# Patient Record
Sex: FEMALE | Race: WHITE | NOT HISPANIC OR LATINO | Employment: FULL TIME | URBAN - METROPOLITAN AREA
[De-identification: names, ages, dates, MRNs, and addresses within clinical notes are randomized per-mention and may not be internally consistent; named-entity substitution may affect disease eponyms.]

---

## 2017-01-25 ENCOUNTER — APPOINTMENT (OUTPATIENT)
Dept: RADIOLOGY | Facility: MEDICAL CENTER | Age: 52
End: 2017-01-25
Attending: ORTHOPAEDIC SURGERY
Payer: COMMERCIAL

## 2017-01-25 DIAGNOSIS — S83.232D COMPLEX TEAR OF MEDIAL MENISCUS, CURRENT INJURY, LEFT KNEE, SUBSEQUENT ENCOUNTER: ICD-10-CM

## 2017-02-23 DIAGNOSIS — Z01.810 PRE-OPERATIVE CARDIOVASCULAR EXAMINATION: ICD-10-CM

## 2017-02-23 DIAGNOSIS — Z01.812 PRE-OPERATIVE LABORATORY EXAMINATION: ICD-10-CM

## 2017-02-23 LAB
ANION GAP SERPL CALC-SCNC: 9 MMOL/L (ref 0–11.9)
BUN SERPL-MCNC: 16 MG/DL (ref 8–22)
CALCIUM SERPL-MCNC: 9.8 MG/DL (ref 8.4–10.2)
CHLORIDE SERPL-SCNC: 102 MMOL/L (ref 96–112)
CO2 SERPL-SCNC: 27 MMOL/L (ref 20–33)
CREAT SERPL-MCNC: 0.8 MG/DL (ref 0.5–1.4)
ERYTHROCYTE [DISTWIDTH] IN BLOOD BY AUTOMATED COUNT: 43.9 FL (ref 35.9–50)
GFR SERPL CREATININE-BSD FRML MDRD: >60 ML/MIN/1.73 M 2
GLUCOSE SERPL-MCNC: 115 MG/DL (ref 65–99)
HCT VFR BLD AUTO: 44.6 % (ref 37–47)
HGB BLD-MCNC: 15.4 G/DL (ref 12–16)
MCH RBC QN AUTO: 30 PG (ref 27–33)
MCHC RBC AUTO-ENTMCNC: 34.5 G/DL (ref 33.6–35)
MCV RBC AUTO: 86.9 FL (ref 81.4–97.8)
PLATELET # BLD AUTO: 269 K/UL (ref 164–446)
PMV BLD AUTO: 10.5 FL (ref 9–12.9)
POTASSIUM SERPL-SCNC: 3.9 MMOL/L (ref 3.6–5.5)
RBC # BLD AUTO: 5.13 M/UL (ref 4.2–5.4)
SODIUM SERPL-SCNC: 138 MMOL/L (ref 135–145)
WBC # BLD AUTO: 9.3 K/UL (ref 4.8–10.8)

## 2017-02-23 PROCEDURE — 85027 COMPLETE CBC AUTOMATED: CPT

## 2017-02-23 PROCEDURE — 36415 COLL VENOUS BLD VENIPUNCTURE: CPT

## 2017-02-23 PROCEDURE — 80048 BASIC METABOLIC PNL TOTAL CA: CPT

## 2017-02-23 RX ORDER — LISINOPRIL 5 MG/1
5 TABLET ORAL DAILY
COMMUNITY
End: 2018-10-22 | Stop reason: SDUPTHER

## 2017-02-23 RX ORDER — MELOXICAM 15 MG/1
15 TABLET ORAL DAILY
COMMUNITY
End: 2018-06-20

## 2017-02-23 RX ORDER — ESTRADIOL 1 MG/1
1 TABLET ORAL DAILY
COMMUNITY
End: 2018-06-20

## 2017-02-23 RX ORDER — CITALOPRAM 20 MG/1
20 TABLET ORAL DAILY
COMMUNITY
End: 2018-10-22 | Stop reason: SDUPTHER

## 2017-02-23 RX ORDER — LANSOPRAZOLE 30 MG/1
30 CAPSULE, DELAYED RELEASE ORAL DAILY
COMMUNITY
End: 2020-07-09 | Stop reason: SDUPTHER

## 2017-02-23 RX ORDER — HYDROCHLOROTHIAZIDE 25 MG/1
25 TABLET ORAL DAILY
COMMUNITY
End: 2018-10-22 | Stop reason: SDUPTHER

## 2017-02-23 NOTE — OR NURSING
Pre-admit appointment completed. Pt instructed to continue regularly prescribed medications through the day before surgery. Pt instructed to take the following medications the day of surgery with a sip of water, per anesthesia protocol; prevacid and xanax if needed.     Pt given HODA education.

## 2017-02-24 LAB — EKG IMPRESSION: NORMAL

## 2017-03-06 ENCOUNTER — HOSPITAL ENCOUNTER (OUTPATIENT)
Facility: MEDICAL CENTER | Age: 52
End: 2017-03-06
Attending: ORTHOPAEDIC SURGERY | Admitting: ORTHOPAEDIC SURGERY
Payer: COMMERCIAL

## 2017-03-06 VITALS
HEART RATE: 92 BPM | DIASTOLIC BLOOD PRESSURE: 96 MMHG | OXYGEN SATURATION: 95 % | TEMPERATURE: 97.3 F | BODY MASS INDEX: 36.19 KG/M2 | WEIGHT: 238.76 LBS | RESPIRATION RATE: 16 BRPM | SYSTOLIC BLOOD PRESSURE: 143 MMHG | HEIGHT: 68 IN

## 2017-03-06 PROBLEM — S83.232A COMPLEX TEAR OF MEDIAL MENISCUS OF LEFT KNEE AS CURRENT INJURY: Status: ACTIVE | Noted: 2017-03-06

## 2017-03-06 PROCEDURE — 160002 HCHG RECOVERY MINUTES (STAT): Performed by: ORTHOPAEDIC SURGERY

## 2017-03-06 PROCEDURE — 160025 RECOVERY II MINUTES (STATS): Performed by: ORTHOPAEDIC SURGERY

## 2017-03-06 PROCEDURE — 160036 HCHG PACU - EA ADDL 30 MINS PHASE I: Performed by: ORTHOPAEDIC SURGERY

## 2017-03-06 PROCEDURE — 502580 HCHG PACK, KNEE ARTHROSCOPY: Performed by: ORTHOPAEDIC SURGERY

## 2017-03-06 PROCEDURE — 160009 HCHG ANES TIME/MIN: Performed by: ORTHOPAEDIC SURGERY

## 2017-03-06 PROCEDURE — 110382 HCHG SHELL REV 271: Performed by: ORTHOPAEDIC SURGERY

## 2017-03-06 PROCEDURE — 160029 HCHG SURGERY MINUTES - 1ST 30 MINS LEVEL 4: Performed by: ORTHOPAEDIC SURGERY

## 2017-03-06 PROCEDURE — 700101 HCHG RX REV CODE 250

## 2017-03-06 PROCEDURE — 160041 HCHG SURGERY MINUTES - EA ADDL 1 MIN LEVEL 4: Performed by: ORTHOPAEDIC SURGERY

## 2017-03-06 PROCEDURE — A6222 GAUZE <=16 IN NO W/SAL W/O B: HCPCS | Performed by: ORTHOPAEDIC SURGERY

## 2017-03-06 PROCEDURE — 700111 HCHG RX REV CODE 636 W/ 250 OVERRIDE (IP)

## 2017-03-06 PROCEDURE — 110371 HCHG SHELL REV 272: Performed by: ORTHOPAEDIC SURGERY

## 2017-03-06 PROCEDURE — 160035 HCHG PACU - 1ST 60 MINS PHASE I: Performed by: ORTHOPAEDIC SURGERY

## 2017-03-06 PROCEDURE — 160048 HCHG OR STATISTICAL LEVEL 1-5: Performed by: ORTHOPAEDIC SURGERY

## 2017-03-06 PROCEDURE — 501654 HCHG TUBING, ARTHREX PATIENT REDEUCE: Performed by: ORTHOPAEDIC SURGERY

## 2017-03-06 PROCEDURE — 501336 HCHG SHAVER: Performed by: ORTHOPAEDIC SURGERY

## 2017-03-06 PROCEDURE — A4606 OXYGEN PROBE USED W OXIMETER: HCPCS | Performed by: ORTHOPAEDIC SURGERY

## 2017-03-06 PROCEDURE — 160046 HCHG PACU - 1ST 60 MINS PHASE II: Performed by: ORTHOPAEDIC SURGERY

## 2017-03-06 RX ORDER — SODIUM CHLORIDE, SODIUM LACTATE, POTASSIUM CHLORIDE, CALCIUM CHLORIDE 600; 310; 30; 20 MG/100ML; MG/100ML; MG/100ML; MG/100ML
1000 INJECTION, SOLUTION INTRAVENOUS
Status: COMPLETED | OUTPATIENT
Start: 2017-03-06 | End: 2017-03-06

## 2017-03-06 RX ORDER — OXYCODONE HYDROCHLORIDE 5 MG/1
5 TABLET ORAL
Status: DISCONTINUED | OUTPATIENT
Start: 2017-03-06 | End: 2017-03-06 | Stop reason: HOSPADM

## 2017-03-06 RX ORDER — BUPIVACAINE HYDROCHLORIDE 2.5 MG/ML
INJECTION, SOLUTION EPIDURAL; INFILTRATION; INTRACAUDAL
Status: DISCONTINUED | OUTPATIENT
Start: 2017-03-06 | End: 2017-03-06 | Stop reason: HOSPADM

## 2017-03-06 RX ORDER — DEXTROSE MONOHYDRATE, SODIUM CHLORIDE, AND POTASSIUM CHLORIDE 50; 1.49; 4.5 G/1000ML; G/1000ML; G/1000ML
INJECTION, SOLUTION INTRAVENOUS CONTINUOUS
Status: DISCONTINUED | OUTPATIENT
Start: 2017-03-06 | End: 2017-03-06 | Stop reason: HOSPADM

## 2017-03-06 RX ORDER — DEXAMETHASONE SODIUM PHOSPHATE 4 MG/ML
4 INJECTION, SOLUTION INTRA-ARTICULAR; INTRALESIONAL; INTRAMUSCULAR; INTRAVENOUS; SOFT TISSUE
Status: DISCONTINUED | OUTPATIENT
Start: 2017-03-06 | End: 2017-03-06 | Stop reason: HOSPADM

## 2017-03-06 RX ORDER — OXYCODONE HYDROCHLORIDE 5 MG/1
2.5 TABLET ORAL
Status: DISCONTINUED | OUTPATIENT
Start: 2017-03-06 | End: 2017-03-06 | Stop reason: HOSPADM

## 2017-03-06 RX ORDER — HALOPERIDOL 5 MG/ML
1 INJECTION INTRAMUSCULAR EVERY 6 HOURS PRN
Status: DISCONTINUED | OUTPATIENT
Start: 2017-03-06 | End: 2017-03-06 | Stop reason: HOSPADM

## 2017-03-06 RX ORDER — DIPHENHYDRAMINE HYDROCHLORIDE 50 MG/ML
25 INJECTION INTRAMUSCULAR; INTRAVENOUS EVERY 6 HOURS PRN
Status: DISCONTINUED | OUTPATIENT
Start: 2017-03-06 | End: 2017-03-06 | Stop reason: HOSPADM

## 2017-03-06 RX ORDER — ONDANSETRON 2 MG/ML
4 INJECTION INTRAMUSCULAR; INTRAVENOUS EVERY 4 HOURS PRN
Status: DISCONTINUED | OUTPATIENT
Start: 2017-03-06 | End: 2017-03-06 | Stop reason: HOSPADM

## 2017-03-06 RX ADMIN — SODIUM CHLORIDE, SODIUM LACTATE, POTASSIUM CHLORIDE, CALCIUM CHLORIDE 1000 ML: 600; 310; 30; 20 INJECTION, SOLUTION INTRAVENOUS at 11:00

## 2017-03-06 ASSESSMENT — PAIN SCALES - GENERAL
PAINLEVEL_OUTOF10: 0

## 2017-03-06 NOTE — IP AVS SNAPSHOT
" Home Care Instructions                                                                                                                Name:Shana Dunn  Medical Record Number:[id number]  CSN: [id number]    YOB: 1965   Age: 51 y.o.  Sex: female  HT:1.727 m (5' 7.99\") WT: 108.3 kg (238 lb 12.1 oz)          Admit Date: 3/6/2017     Discharge Date:   Today's Date: 3/6/2017  Attending Doctor:  Mati Wagoner M.D.                  Allergies:  Penicillins; Zithromax; Crestor; and Wheat bran                Discharge Instructions         ACTIVITY: Rest and take it easy for the first 24 hours.  A responsible adult is recommended to remain with you during that time.  It is normal to feel sleepy.  We encourage you to not do anything that requires balance, judgment or coordination.    MILD FLU-LIKE SYMPTOMS ARE NORMAL. YOU MAY EXPERIENCE GENERALIZED MUSCLE ACHES, THROAT IRRITATION, HEADACHE AND/OR SOME NAUSEA.    FOR 24 HOURS DO NOT:  Drive, operate machinery or run household appliances.  Drink beer or alcoholic beverages.   Make important decisions or sign legal documents.    SPECIAL INSTRUCTIONS: Weight bear as tolerated    DIET: To avoid nausea, slowly advance diet as tolerated, avoiding spicy or greasy foods for the first day.  Add more substantial food to your diet according to your physician's instructions. INCREASE FLUIDS AND FIBER TO AVOID CONSTIPATION.    SURGICAL DRESSING/BATHING: Keep dressing clean, dry and intact until instructed otherwise by surgeon    FOLLOW-UP APPOINTMENT:  A follow-up appointment should be arranged with your doctor; call to schedule.    You should CALL YOUR PHYSICIAN if you develop:  Fever greater than 101 degrees F.  Pain not relieved by medication, or persistent nausea or vomiting.  Excessive bleeding (blood soaking through dressing) or unexpected drainage from the wound.  Extreme redness or swelling around the incision site, drainage of pus or foul smelling drainage.  Inability " to urinate or empty your bladder within 8 hours.  Problems with breathing or chest pain.    You should call 911 if you develop problems with breathing or chest pain.  If you are unable to contact your doctor or surgical center, you should go to the nearest emergency room or urgent care center.  Physician's telephone #: [phone]    If any questions arise, call your doctor.  If your doctor is not available, please feel free to call the Surgical Center at [phone].  The Center is open Monday through Friday from 7AM to 7PM.  You can also call the HEALTH HOTLINE open 24 hours/day, 7 days/week and speak to a nurse at [phone], or toll free at [phone].    A registered nurse may call you a few days after your surgery to see how you are doing after your procedure.    MEDICATIONS: Resume taking daily medication.  Take prescribed pain medication with food.  If no medication is prescribed, you may take non-aspirin pain medication if needed.  PAIN MEDICATION CAN BE VERY CONSTIPATING.  Take a stool softener or laxative such as senokot, pericolace, or milk of magnesia if needed.    Prescription given for home.  Last pain medication given at ______________.    If your physician has prescribed pain medication that includes Acetaminophen (Tylenol), do not take additional Acetaminophen (Tylenol) while taking the prescribed medication.    Depression / Suicide Risk    As you are discharged from this [facility] facility, it is important to learn how to keep safe from harming yourself.    Recognize the warning signs:  · Abrupt changes in personality, positive or negative- including increase in energy   · Giving away possessions  · Change in eating patterns- significant weight changes-  positive or negative  · Change in sleeping patterns- unable to sleep or sleeping all the time   · Unwillingness or inability to communicate  · Depression  · Unusual sadness, discouragement and loneliness  · Talk of wanting to  die  · Neglect of personal appearance   · Rebelliousness- reckless behavior  · Withdrawal from people/activities they love  · Confusion- inability to concentrate     If you or a loved one observes any of these behaviors or has concerns about self-harm, here's what you can do:  · Talk about it- your feelings and reasons for harming yourself  · Remove any means that you might use to hurt yourself (examples: pills, rope, extension cords, firearm)  · Get professional help from the community (Mental Health, Substance Abuse, psychological counseling)  · Do not be alone:Call your Safe Contact- someone whom you trust who will be there for you.  · Call your local CRISIS HOTLINE 668-9195 or 943-437-9707  · Call your local Children's Mobile Crisis Response Team [place] (153) 288-5167 or www.[employer]  · Call the toll free National Suicide Prevention Hotlines   · National Suicide Prevention Lifeline 682-369-FUQP (3662)  National [employer] Line Network 800-SUICIDE (165-2025)    Discharge Education for patients on HODA (Obstructive Sleep Apnea) Protocol    Prior to receiving sedation or anesthesia, we screen all patients for Obstructive Sleep Apnea.  During your screening, you were identified as having suspected, but not confirmed Obstructive Sleep Apnea(HODA).    What is Obstructive Sleep Apnea?  Sleep apnea (AP-ne-ah) is a common disorder which involves breathing pauses that occur during sleep.  These can last from 10 seconds to a minute or longer.  Normal breathing resumes often with a loud snort or choking sound.    Sleep apnea occurs in all age groups and both genders but is more common in men and people over 40 years of age.  It has been estimated that as many as 18 million Americans have sleep apnea.  Most people who have sleep apnea don’t know they have it because it only occurs during sleep.  A family member and/or bed partner may first notice the signs of sleep apnea.  Sleep apnea is a chronic (ongoing) condition  that disrupts the quality and quantity of your sleep repeatedly throughout the night.  This often results in excessive daytime sleepiness or fatigue during the day.  It may also contribute to high blood pressure, heart problems, and complications following medications used for surgery and procedures.    To establish a definitive diagnosis, further testing from a specialist would be needed.  We recommend that you follow up with your primary care physician.    We recommend that you should be with an adult observer for at least 24 hours after your sedation/anesthesia.  If you have a CPAP machine, you should wear it during any sleep period (day or night) for the week following your procedure.  We encourage you to sleep on your side or in a sitting position, even with napping.  Lying flat on your back increases the risk of apnea and airway obstruction during your post procedure recovery period.    It is important to prevent over-sedation that could increase your risk for apnea.  Please take all pain medication as directed by your physician.  If you are not getting pain relief, please contact your physician to discuss possible approaches to relieving pain while minimizing medications that can affect your breathing and oxygen levels.      ·        Medication List      CONTINUE taking these medications        Instructions    BIOTIN PO    Take 1,000 Units by mouth every day.   Dose:  1000 Units       citalopram 20 MG Tabs   Commonly known as:  CELEXA    Take 20 mg by mouth every day.   Dose:  20 mg       CONTRAVE PO    Take 40 mg by mouth every day.   Dose:  40 mg       estradiol 1 MG Tabs   Commonly known as:  ESTRACE    Take 1 mg by mouth every day.   Dose:  1 mg       hydrochlorothiazide 25 MG Tabs   Commonly known as:  HYDRODIURIL    Take 25 mg by mouth every day.   Dose:  25 mg       lansoprazole 30 MG Cpdr   Commonly known as:  PREVACID    Take 30 mg by mouth every day.   Dose:  30 mg       lisinopril 5 MG Tabs    Commonly known as:  PRINIVIL    Take 5 mg by mouth every day.   Dose:  5 mg       meloxicam 15 MG tablet   Commonly known as:  MOBIC    Take 15 mg by mouth every day.   Dose:  15 mg       vitamin D3 5000 UNITS Caps    Take 1 Cap by mouth every day.   Dose:  1 Cap       XANAX PO    Take  by mouth as needed.               Medication Information     Above and/or attached are the medications your physician expects you to take upon discharge. Review all of your home medications and newly ordered medications with your doctor and/or pharmacist. Follow medication instructions as directed by your doctor and/or pharmacist. Please keep your medication list with you and share with your physician. Update the information when medications are discontinued, doses are changed, or new medications (including over-the-counter products) are added; and carry medication information at all times in the event of emergency situations.        Resources     Quit Smoking / Tobacco Use:    I understand the use of any tobacco products increases my chance of suffering from future heart disease or stroke and could cause other illnesses which may shorten my life. Quitting the use of tobacco products is the single most important thing I can do to improve my health. For further information on smoking / tobacco cessation call a Toll Free Quit Line at 1-195.978.4631 (*[facility] [place]) or 1-560.724.9823 (American Lung Association) or you can access the web based program at www.lungusa.org.    [place] Tobacco Users Help Line:  (137) 803-2777       Toll Free: 1-182.763.4061  Quit Tobacco Program [facility] (763)194-4852    Crisis Hotline:    [place] Crisis Hotline:  5-356-GAKESAQ or 1-466.201.9074    [place] Crisis Hotline:    1-445.138.4442 or 716-780-8008    Discharge Survey:   Thank you for choosing [facility]. We hope we did everything we could to make your hospital stay a pleasant one. You may be receiving a survey  and we would appreciate your time and participation in answering the questions. Your input is very valuable to us in our efforts to improve our service to our patients and their families.            Signatures     My signature on this form indicates that:    1. I acknowledge receipt and understanding of these Home Care Instruction.  2. My questions regarding this information have been answered to my satisfaction.  3. I have formulated a plan with my discharge nurse to obtain my prescribed medications for home.    __________________________________      __________________________________                   Patient Signature                                 Guardian/Responsible Adult Signature      __________________________________                 __________       ________                       Nurse Signature                                               Date                 Time

## 2017-03-06 NOTE — OR NURSING
1315-Patient arrived in pacu stage 2, awake, alert and oriented. Patient dressed and able to transfer from [facility] to [facility] without difficulty, gait was steady. Vital signs are stable. Patient denies any pain or nausea at this time. No sign or symptoms of acute distress seen. Dressing right knee clean, dry and intact. Immobilizer to right leg in place, cap refill <3 sec to operative leg. CMS intact.   1325-Patient awake, A&O, vital signs remain stable, no sign and symptoms of acute distress seen, and denies any pain or nausea. Patient meets discharge criteria. Plan of care and discharge instructions reviewed and discussed with patient and family. All questions were answered and all parties verbalized understanding.

## 2017-03-06 NOTE — OR NURSING
1204: To PACU from OR via [facility], very drowsy, rouses briefly for assessment and to deny pain/nausea, respirations spontaneous and non-labored. Icepack applied over c/d/i R knee surgical dressings. Plan to keep pt in PACU for full hour per [facility] protocol.  1215: No change, O2 weaning commenced.  1230: O2 d/jo, pt continues to deny pain.   1245: Tolerating po water.  1300: No change in surgical site assessment.  1304: Meets criteria to transfer to Stage 2

## 2017-03-06 NOTE — OR SURGEON
Immediate Post-Operative Note      PreOp Diagnosis: R MMT    PostOp Diagnosis: R MMT/LMT    Procedure(s):  KNEE ARTHROSCOPY - Wound Class: Clean  MEDIAL AND LATERAL MENISCECTOMY - PARTIAL, CHONDROPLASTY - Wound Class: Clean    Surgeon(s):  Mati Wagoner M.D.    Anesthesiologist/Type of Anesthesia:  Anesthesiologist: Cesar Mueller M.D./General    Surgical Staff:  Circulator: Jana Guajardo R.N.  Scrub Person: Natalie Rubio    Specimen: none    Estimated Blood Loss: min    Findings: above    Complications: none        3/6/2017 12:01 PM Mati Wagoner

## 2017-03-06 NOTE — IP AVS SNAPSHOT
3/6/2017          Shana Dunn  [address] [place] [address]    Dear Shana:    [facility] wants to ensure your discharge home is safe and you or your loved ones have had all your questions answered regarding your care after you leave the hospital.    You may receive a telephone call within two days of your discharge.  This call is to make certain you understand your discharge instructions as well as ensure we provided you with the best care possible during your stay with us.     The call will only last approximately 3-5 minutes and will be done by a nurse.    Once again, we want to ensure your discharge home is safe and that you have a clear understanding of any next steps in your care.  If you have any questions or concerns, please do not hesitate to contact us, we are here for you.  Thank you for choosing [facility] for your healthcare needs.    Sincerely,    Williams Gautam    [facility]

## 2017-03-06 NOTE — DISCHARGE INSTRUCTIONS
ACTIVITY: Rest and take it easy for the first 24 hours.  A responsible adult is recommended to remain with you during that time.  It is normal to feel sleepy.  We encourage you to not do anything that requires balance, judgment or coordination.    MILD FLU-LIKE SYMPTOMS ARE NORMAL. YOU MAY EXPERIENCE GENERALIZED MUSCLE ACHES, THROAT IRRITATION, HEADACHE AND/OR SOME NAUSEA.    FOR 24 HOURS DO NOT:  Drive, operate machinery or run household appliances.  Drink beer or alcoholic beverages.   Make important decisions or sign legal documents.    SPECIAL INSTRUCTIONS: Weight bear as tolerated    DIET: To avoid nausea, slowly advance diet as tolerated, avoiding spicy or greasy foods for the first day.  Add more substantial food to your diet according to your physician's instructions. INCREASE FLUIDS AND FIBER TO AVOID CONSTIPATION.    SURGICAL DRESSING/BATHING: Keep dressing clean, dry and intact until instructed otherwise by surgeon    FOLLOW-UP APPOINTMENT:  A follow-up appointment should be arranged with your doctor; call to schedule.    You should CALL YOUR PHYSICIAN if you develop:  Fever greater than 101 degrees F.  Pain not relieved by medication, or persistent nausea or vomiting.  Excessive bleeding (blood soaking through dressing) or unexpected drainage from the wound.  Extreme redness or swelling around the incision site, drainage of pus or foul smelling drainage.  Inability to urinate or empty your bladder within 8 hours.  Problems with breathing or chest pain.    You should call 911 if you develop problems with breathing or chest pain.  If you are unable to contact your doctor or surgical center, you should go to the nearest emergency room or urgent care center.  Physician's telephone #: [phone]    If any questions arise, call your doctor.  If your doctor is not available, please feel free to call the Surgical Center at [phone].  The Center is open Monday through Friday from 7AM to 7PM.  You can also  call the HEALTH HOTLINE open 24 hours/day, 7 days/week and speak to a nurse at [phone], or toll free at [phone].    A registered nurse may call you a few days after your surgery to see how you are doing after your procedure.    MEDICATIONS: Resume taking daily medication.  Take prescribed pain medication with food.  If no medication is prescribed, you may take non-aspirin pain medication if needed.  PAIN MEDICATION CAN BE VERY CONSTIPATING.  Take a stool softener or laxative such as senokot, pericolace, or milk of magnesia if needed.    Prescription given for home.  Last pain medication given at ______________.    If your physician has prescribed pain medication that includes Acetaminophen (Tylenol), do not take additional Acetaminophen (Tylenol) while taking the prescribed medication.    Depression / Suicide Risk    As you are discharged from this [facility] facility, it is important to learn how to keep safe from harming yourself.    Recognize the warning signs:  · Abrupt changes in personality, positive or negative- including increase in energy   · Giving away possessions  · Change in eating patterns- significant weight changes-  positive or negative  · Change in sleeping patterns- unable to sleep or sleeping all the time   · Unwillingness or inability to communicate  · Depression  · Unusual sadness, discouragement and loneliness  · Talk of wanting to die  · Neglect of personal appearance   · Rebelliousness- reckless behavior  · Withdrawal from people/activities they love  · Confusion- inability to concentrate     If you or a loved one observes any of these behaviors or has concerns about self-harm, here's what you can do:  · Talk about it- your feelings and reasons for harming yourself  · Remove any means that you might use to hurt yourself (examples: pills, rope, extension cords, firearm)  · Get professional help from the community (Mental Health, Substance Abuse, psychological  counseling)  · Do not be alone:Call your Safe Contact- someone whom you trust who will be there for you.  · Call your local CRISIS HOTLINE 455-2333 or 550-191-7123  · Call your local Children's Mobile Crisis Response Team [place] (691) 670-5906 or www.[employer]  · Call the toll free National Suicide Prevention Hotlines   · National Suicide Prevention Lifeline 824-108-PTXD (3785)  [facility] Line Network 800-SUICIDE (265-2199)    Discharge Education for patients on HODA (Obstructive Sleep Apnea) Protocol    Prior to receiving sedation or anesthesia, we screen all patients for Obstructive Sleep Apnea.  During your screening, you were identified as having suspected, but not confirmed Obstructive Sleep Apnea(HODA).    What is Obstructive Sleep Apnea?  Sleep apnea (AP-ne-ah) is a common disorder which involves breathing pauses that occur during sleep.  These can last from 10 seconds to a minute or longer.  Normal breathing resumes often with a loud snort or choking sound.    Sleep apnea occurs in all age groups and both genders but is more common in men and people over 40 years of age.  It has been estimated that as many as 18 million Americans have sleep apnea.  Most people who have sleep apnea don’t know they have it because it only occurs during sleep.  A family member and/or bed partner may first notice the signs of sleep apnea.  Sleep apnea is a chronic (ongoing) condition that disrupts the quality and quantity of your sleep repeatedly throughout the night.  This often results in excessive daytime sleepiness or fatigue during the day.  It may also contribute to high blood pressure, heart problems, and complications following medications used for surgery and procedures.    To establish a definitive diagnosis, further testing from a specialist would be needed.  We recommend that you follow up with your primary care physician.    We recommend that you should be with an adult observer for at least 24 hours  after your sedation/anesthesia.  If you have a CPAP machine, you should wear it during any sleep period (day or night) for the week following your procedure.  We encourage you to sleep on your side or in a sitting position, even with napping.  Lying flat on your back increases the risk of apnea and airway obstruction during your post procedure recovery period.    It is important to prevent over-sedation that could increase your risk for apnea.  Please take all pain medication as directed by your physician.  If you are not getting pain relief, please contact your physician to discuss possible approaches to relieving pain while minimizing medications that can affect your breathing and oxygen levels.      ·

## 2017-03-06 NOTE — OP REPORT
DATE OF SERVICE:  03/06/2017    PREOPERATIVE DIAGNOSIS:  Right knee medial meniscal tear.    POSTOPERATIVE DIAGNOSES:  1.  Right knee medial meniscal tear.  2.  Lateral meniscal tear.  3.  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    PROCEDURES PERFORMED:  1.  Right knee arthroscopy with partial medial and lateral meniscectomies.  2.  Chondroplasty of medial femoral condyle and lateral femoral condyle.    ANESTHESIA:  General.    COMPLICATIONS:  None.    BLOOD LOSS:  Minimal.    TOURNIQUET:  None.    INDICATION:  The patient is a 51-year-old woman with a recent onset of right   medial knee pain, swelling and mechanical symptoms and an MRI suggesting a   medial meniscal tear.  She is indicated for arthroscopic management.    FINDINGS:  Exam under anesthesia revealed moderate effusion, full range of   motion, ligamentous exam is stable.  Arthroscopic examination of the   suprapatellar pouch and medial and lateral gutters was unremarkable.    Examination of the patella showed minimal stable grade II chondrosis.    Examination of the trochlea showed stable grade II to III central chondrosis.    Examination of the notch revealed the cruciate ligaments to be intact.    Examination of the lateral compartment revealed a small near full thickness   chondral flap from the central portion of the lateral femoral condyle.    Following debridement of the unstable flap, this still measured considerably   less than a centimeter in diameter.  There is a small radial tear of the body   of the lateral meniscus with a small unstable flap.  Examination of the medial   compartment revealed diffuse grade III chondrosis of the medial femoral   condyle with loose chondral flaps.  There was a primarily horizontal cleavage   tear of the posterior horn of the medial meniscus with an unstable and frayed   inferior leaf.    DESCRIPTION OF PROCEDURE:  Following induction  of general anesthesia, time-out   was performed confirming patient, site, and procedure.  A 900 mg of   clindamycin IV was ordered and administered.  The right thigh tourniquet was   applied.  The left leg was placed in a well leg reyes.  Under sterile   conditions, the right knee was injected with 30 mL of 0.25% plain Marcaine in   a standard fashion.  The right lower extremity was prepped and draped in the   usual fashion.  Standard anterolateral and anteromedial portals with   superolateral outflow were established and diagnostic arthroscopy was   performed with findings as above.  The shaver was used to resect the unstable   lateral meniscal flap and to smooth the free edge lateral meniscus.  Next, the   unstable chondral flap on the lateral femoral condyle was debrided with the   shaver.  Next, the shaver was used to debride the loose chondral flaps and   fibrillated articular cartilage from the medial femoral condyle.  The   undersurface of the medial meniscus was debrided removing unstable flaps and   fibrillation and smoothing the remaining meniscus, which was stable to   probing.  The arthroscope was withdrawn.  The portals were closed with nylon   sutures and 30 mL of 0.25% plain Marcaine was injected into the joint.    Sterile dressing was applied followed by ROMIE hose and knee immobilizer.  The   patient was awakened and extubated in the operating room and transferred to   recovery room in stable condition.       ____________________________________     MD DEMETRIUS Lopez / PILAR    DD:  03/06/2017 12:01:50  DT:  03/06/2017 12:12:50    D#:  [id number]  Job#:  [id number]

## 2017-11-20 ENCOUNTER — HOSPITAL ENCOUNTER (OUTPATIENT)
Dept: LAB | Facility: MEDICAL CENTER | Age: 52
End: 2017-11-20
Attending: NURSE PRACTITIONER
Payer: COMMERCIAL

## 2017-11-20 ENCOUNTER — HOSPITAL ENCOUNTER (OUTPATIENT)
Dept: LAB | Facility: MEDICAL CENTER | Age: 52
End: 2017-11-20
Attending: ORTHOPAEDIC SURGERY
Payer: COMMERCIAL

## 2017-11-20 LAB
BASOPHILS # BLD AUTO: 1.1 % (ref 0–1.8)
BASOPHILS # BLD: 0.11 K/UL (ref 0–0.12)
CRP SERPL HS-MCNC: 2.16 MG/DL (ref 0–0.75)
EOSINOPHIL # BLD AUTO: 0.19 K/UL (ref 0–0.51)
EOSINOPHIL NFR BLD: 1.9 % (ref 0–6.9)
ERYTHROCYTE [DISTWIDTH] IN BLOOD BY AUTOMATED COUNT: 45 FL (ref 35.9–50)
ERYTHROCYTE [SEDIMENTATION RATE] IN BLOOD BY WESTERGREN METHOD: 14 MM/HOUR (ref 0–30)
GFR SERPL CREATININE-BSD FRML MDRD: >60 ML/MIN/1.73 M 2
HCT VFR BLD AUTO: 43.8 % (ref 37–47)
HGB BLD-MCNC: 14.6 G/DL (ref 12–16)
IMM GRANULOCYTES # BLD AUTO: 0.07 K/UL (ref 0–0.11)
IMM GRANULOCYTES NFR BLD AUTO: 0.7 % (ref 0–0.9)
LYMPHOCYTES # BLD AUTO: 2.5 K/UL (ref 1–4.8)
LYMPHOCYTES NFR BLD: 25.4 % (ref 22–41)
MCH RBC QN AUTO: 30.1 PG (ref 27–33)
MCHC RBC AUTO-ENTMCNC: 33.3 G/DL (ref 33.6–35)
MCV RBC AUTO: 90.3 FL (ref 81.4–97.8)
MONOCYTES # BLD AUTO: 0.4 K/UL (ref 0–0.85)
MONOCYTES NFR BLD AUTO: 4.1 % (ref 0–13.4)
NEUTROPHILS # BLD AUTO: 6.57 K/UL (ref 2–7.15)
NEUTROPHILS NFR BLD: 66.8 % (ref 44–72)
NRBC # BLD AUTO: 0 K/UL
NRBC BLD AUTO-RTO: 0 /100 WBC
PLATELET # BLD AUTO: 312 K/UL (ref 164–446)
PMV BLD AUTO: 10.9 FL (ref 9–12.9)
RBC # BLD AUTO: 4.85 M/UL (ref 4.2–5.4)
RHEUMATOID FACT SER IA-ACNC: <10 IU/ML (ref 0–14)
URATE SERPL-MCNC: 7.5 MG/DL (ref 1.9–8.2)
WBC # BLD AUTO: 9.8 K/UL (ref 4.8–10.8)

## 2017-11-20 PROCEDURE — 85652 RBC SED RATE AUTOMATED: CPT

## 2017-11-20 PROCEDURE — 86225 DNA ANTIBODY NATIVE: CPT

## 2017-11-20 PROCEDURE — 86140 C-REACTIVE PROTEIN: CPT

## 2017-11-20 PROCEDURE — 80053 COMPREHEN METABOLIC PANEL: CPT

## 2017-11-20 PROCEDURE — 86431 RHEUMATOID FACTOR QUANT: CPT

## 2017-11-20 PROCEDURE — 86812 HLA TYPING A B OR C: CPT

## 2017-11-20 PROCEDURE — 82043 UR ALBUMIN QUANTITATIVE: CPT

## 2017-11-20 PROCEDURE — 36415 COLL VENOUS BLD VENIPUNCTURE: CPT

## 2017-11-20 PROCEDURE — 82570 ASSAY OF URINE CREATININE: CPT

## 2017-11-20 PROCEDURE — 85025 COMPLETE CBC W/AUTO DIFF WBC: CPT

## 2017-11-20 PROCEDURE — 86235 NUCLEAR ANTIGEN ANTIBODY: CPT | Mod: 91

## 2017-11-20 PROCEDURE — 86803 HEPATITIS C AB TEST: CPT

## 2017-11-20 PROCEDURE — 82306 VITAMIN D 25 HYDROXY: CPT

## 2017-11-20 PROCEDURE — 84550 ASSAY OF BLOOD/URIC ACID: CPT

## 2017-11-20 PROCEDURE — 80061 LIPID PANEL: CPT

## 2017-11-20 PROCEDURE — 85025 COMPLETE CBC W/AUTO DIFF WBC: CPT | Mod: 91

## 2017-11-20 PROCEDURE — 86038 ANTINUCLEAR ANTIBODIES: CPT

## 2017-11-20 PROCEDURE — 83036 HEMOGLOBIN GLYCOSYLATED A1C: CPT

## 2017-11-20 PROCEDURE — 84443 ASSAY THYROID STIM HORMONE: CPT

## 2017-11-21 LAB
25(OH)D3 SERPL-MCNC: 44 NG/ML (ref 30–100)
ALBUMIN SERPL BCP-MCNC: 4.2 G/DL (ref 3.2–4.9)
ALBUMIN/GLOB SERPL: 1.5 G/DL
ALP SERPL-CCNC: 78 U/L (ref 30–99)
ALT SERPL-CCNC: 17 U/L (ref 2–50)
ANION GAP SERPL CALC-SCNC: 9 MMOL/L (ref 0–11.9)
AST SERPL-CCNC: 18 U/L (ref 12–45)
BASOPHILS # BLD AUTO: 0.8 % (ref 0–1.8)
BASOPHILS # BLD: 0.08 K/UL (ref 0–0.12)
BILIRUB SERPL-MCNC: 0.7 MG/DL (ref 0.1–1.5)
BUN SERPL-MCNC: 18 MG/DL (ref 8–22)
CALCIUM SERPL-MCNC: 9.6 MG/DL (ref 8.5–10.5)
CHLORIDE SERPL-SCNC: 103 MMOL/L (ref 96–112)
CHOLEST SERPL-MCNC: 242 MG/DL (ref 100–199)
CO2 SERPL-SCNC: 27 MMOL/L (ref 20–33)
CREAT SERPL-MCNC: 0.68 MG/DL (ref 0.5–1.4)
CREAT UR-MCNC: 143 MG/DL
EOSINOPHIL # BLD AUTO: 0.21 K/UL (ref 0–0.51)
EOSINOPHIL NFR BLD: 2.2 % (ref 0–6.9)
ERYTHROCYTE [DISTWIDTH] IN BLOOD BY AUTOMATED COUNT: 46.2 FL (ref 35.9–50)
EST. AVERAGE GLUCOSE BLD GHB EST-MCNC: 123 MG/DL
GLOBULIN SER CALC-MCNC: 2.8 G/DL (ref 1.9–3.5)
GLUCOSE SERPL-MCNC: 106 MG/DL (ref 65–99)
HBA1C MFR BLD: 5.9 % (ref 0–5.6)
HCT VFR BLD AUTO: 44.3 % (ref 37–47)
HCV AB SER QL: NEGATIVE
HDLC SERPL-MCNC: 51 MG/DL
HGB BLD-MCNC: 14.6 G/DL (ref 12–16)
IMM GRANULOCYTES # BLD AUTO: 0.07 K/UL (ref 0–0.11)
IMM GRANULOCYTES NFR BLD AUTO: 0.7 % (ref 0–0.9)
LDLC SERPL CALC-MCNC: 133 MG/DL
LYMPHOCYTES # BLD AUTO: 2.45 K/UL (ref 1–4.8)
LYMPHOCYTES NFR BLD: 25.1 % (ref 22–41)
MCH RBC QN AUTO: 29.9 PG (ref 27–33)
MCHC RBC AUTO-ENTMCNC: 33 G/DL (ref 33.6–35)
MCV RBC AUTO: 90.8 FL (ref 81.4–97.8)
MICROALBUMIN UR-MCNC: 0.7 MG/DL
MICROALBUMIN/CREAT UR: 5 MG/G (ref 0–30)
MONOCYTES # BLD AUTO: 0.42 K/UL (ref 0–0.85)
MONOCYTES NFR BLD AUTO: 4.3 % (ref 0–13.4)
NEUTROPHILS # BLD AUTO: 6.53 K/UL (ref 2–7.15)
NEUTROPHILS NFR BLD: 66.9 % (ref 44–72)
NRBC # BLD AUTO: 0 K/UL
NRBC BLD AUTO-RTO: 0 /100 WBC
PLATELET # BLD AUTO: 310 K/UL (ref 164–446)
PMV BLD AUTO: 11 FL (ref 9–12.9)
POTASSIUM SERPL-SCNC: 4 MMOL/L (ref 3.6–5.5)
PROT SERPL-MCNC: 7 G/DL (ref 6–8.2)
RBC # BLD AUTO: 4.88 M/UL (ref 4.2–5.4)
SODIUM SERPL-SCNC: 139 MMOL/L (ref 135–145)
TRIGL SERPL-MCNC: 292 MG/DL (ref 0–149)
TSH SERPL DL<=0.005 MIU/L-ACNC: 1.4 UIU/ML (ref 0.3–3.7)
WBC # BLD AUTO: 9.8 K/UL (ref 4.8–10.8)

## 2017-11-22 LAB
HLA-B27 QL FC: NEGATIVE
NUCLEAR IGG SER QL IA: NORMAL

## 2018-01-16 ENCOUNTER — HOSPITAL ENCOUNTER (OUTPATIENT)
Dept: RADIOLOGY | Facility: MEDICAL CENTER | Age: 53
End: 2018-01-16
Attending: NURSE PRACTITIONER
Payer: COMMERCIAL

## 2018-01-16 DIAGNOSIS — Z12.31 VISIT FOR SCREENING MAMMOGRAM: ICD-10-CM

## 2018-01-16 PROCEDURE — 77067 SCR MAMMO BI INCL CAD: CPT

## 2018-01-18 ENCOUNTER — HOSPITAL ENCOUNTER (OUTPATIENT)
Dept: RADIOLOGY | Facility: MEDICAL CENTER | Age: 53
End: 2018-01-18

## 2018-02-20 ENCOUNTER — OFFICE VISIT (OUTPATIENT)
Dept: URGENT CARE | Facility: PHYSICIAN GROUP | Age: 53
End: 2018-02-20
Payer: COMMERCIAL

## 2018-02-20 VITALS
HEART RATE: 88 BPM | BODY MASS INDEX: 35.55 KG/M2 | SYSTOLIC BLOOD PRESSURE: 106 MMHG | RESPIRATION RATE: 14 BRPM | HEIGHT: 69 IN | TEMPERATURE: 99 F | OXYGEN SATURATION: 92 % | DIASTOLIC BLOOD PRESSURE: 76 MMHG | WEIGHT: 240 LBS

## 2018-02-20 DIAGNOSIS — R68.89 FLU-LIKE SYMPTOMS: ICD-10-CM

## 2018-02-20 DIAGNOSIS — B34.9 VIRAL SYNDROME: Primary | ICD-10-CM

## 2018-02-20 DIAGNOSIS — R07.89 CHEST TIGHTNESS: ICD-10-CM

## 2018-02-20 LAB
FLUAV+FLUBV AG SPEC QL IA: NORMAL
INT CON NEG: NEGATIVE
INT CON POS: POSITIVE

## 2018-02-20 PROCEDURE — 94761 N-INVAS EAR/PLS OXIMETRY MLT: CPT | Mod: 59 | Performed by: PHYSICIAN ASSISTANT

## 2018-02-20 PROCEDURE — 87804 INFLUENZA ASSAY W/OPTIC: CPT | Performed by: PHYSICIAN ASSISTANT

## 2018-02-20 PROCEDURE — 94640 AIRWAY INHALATION TREATMENT: CPT | Performed by: PHYSICIAN ASSISTANT

## 2018-02-20 PROCEDURE — 99204 OFFICE O/P NEW MOD 45 MIN: CPT | Mod: 25 | Performed by: PHYSICIAN ASSISTANT

## 2018-02-20 RX ORDER — ALBUTEROL SULFATE 2.5 MG/3ML
2.5 SOLUTION RESPIRATORY (INHALATION) ONCE
Status: COMPLETED | OUTPATIENT
Start: 2018-02-20 | End: 2018-02-20

## 2018-02-20 RX ORDER — ALBUTEROL SULFATE 90 UG/1
2 AEROSOL, METERED RESPIRATORY (INHALATION) EVERY 4 HOURS PRN
Qty: 1 INHALER | Refills: 0 | Status: SHIPPED | OUTPATIENT
Start: 2018-02-20 | End: 2018-06-20

## 2018-02-20 RX ORDER — PROMETHAZINE HYDROCHLORIDE AND CODEINE PHOSPHATE 6.25; 1 MG/5ML; MG/5ML
5 SYRUP ORAL
Qty: 120 ML | Refills: 0 | Status: SHIPPED | OUTPATIENT
Start: 2018-02-20 | End: 2018-02-27

## 2018-02-20 RX ORDER — OSELTAMIVIR PHOSPHATE 75 MG/1
75 CAPSULE ORAL 2 TIMES DAILY
Qty: 10 CAP | Refills: 0 | Status: SHIPPED | OUTPATIENT
Start: 2018-02-20 | End: 2018-02-25

## 2018-02-20 RX ADMIN — ALBUTEROL SULFATE 2.5 MG: 2.5 SOLUTION RESPIRATORY (INHALATION) at 14:26

## 2018-02-20 ASSESSMENT — ENCOUNTER SYMPTOMS
CARDIOVASCULAR NEGATIVE: 1
BACK PAIN: 1
EYES NEGATIVE: 1
HEADACHES: 1
GASTROINTESTINAL NEGATIVE: 1
MYALGIAS: 1
SORE THROAT: 1
PSYCHIATRIC NEGATIVE: 1
CHILLS: 1
FEVER: 1
COUGH: 1

## 2018-02-20 NOTE — PATIENT INSTRUCTIONS
"Flonase and nasal saline irrigation (netti pot or Wilson Med Sinus Rinse).  Used distilled water or boiled tap water with nasal flushes, not straight tap water.  Humidifier at bedtime.  Hot steam showers to loosen up mucous.  Cough medicine at bedtime (nyquil).  Delsym during the day.  Lots of fluids, tea with honey.  Ibuprofen for headache, fever, chills.  Be sure to take with food.  Salt water gargles.  Return if worsening: Yellow thicker mucus changes, worsening pain around the eyes and radiates to the teeth, and fever over 101°F.      Influenza, Adult  Influenza (\"the flu\") is a viral infection of the respiratory tract. It occurs more often in winter months because people spend more time in close contact with one another. Influenza can make you feel very sick. Influenza easily spreads from person to person (contagious).  CAUSES   Influenza is caused by a virus that infects the respiratory tract. You can catch the virus by breathing in droplets from an infected person's cough or sneeze. You can also catch the virus by touching something that was recently contaminated with the virus and then touching your mouth, nose, or eyes.  RISKS AND COMPLICATIONS  You may be at risk for a more severe case of influenza if you smoke cigarettes, have diabetes, have chronic heart disease (such as heart failure) or lung disease (such as asthma), or if you have a weakened immune system. Elderly people and pregnant women are also at risk for more serious infections. The most common problem of influenza is a lung infection (pneumonia). Sometimes, this problem can require emergency medical care and may be life threatening.  SIGNS AND SYMPTOMS   Symptoms typically last 4 to 10 days and may include:  · Fever.  · Chills.  · Headache, body aches, and muscle aches.  · Sore throat.  · Chest discomfort and cough.  · Poor appetite.  · Weakness or feeling tired.  · Dizziness.  · Nausea or vomiting.  DIAGNOSIS   Diagnosis of influenza is often " made based on your history and a physical exam. A nose or throat swab test can be done to confirm the diagnosis.  TREATMENT   In mild cases, influenza goes away on its own. Treatment is directed at relieving symptoms. For more severe cases, your health care provider may prescribe antiviral medicines to shorten the sickness. Antibiotic medicines are not effective because the infection is caused by a virus, not by bacteria.  HOME CARE INSTRUCTIONS  · Take medicines only as directed by your health care provider.  · Use a cool mist humidifier to make breathing easier.  · Get plenty of rest until your temperature returns to normal. This usually takes 3 to 4 days.  · Drink enough fluid to keep your urine clear or pale yellow.  · Cover your mouth and nose when coughing or sneezing, and wash your hands well to prevent the virus from spreading.  · Stay home from work or school until the fever is gone for at least 1 full day.  PREVENTION   An annual influenza vaccination (flu shot) is the best way to avoid getting influenza. An annual flu shot is now routinely recommended for all adults in the [place]  SEEK MEDICAL CARE IF:  · You experience chest pain, your cough worsens, or you produce more mucus.  · You have nausea, vomiting, or diarrhea.  · Your fever returns or gets worse.  SEEK IMMEDIATE MEDICAL CARE IF:  · You have trouble breathing, you become short of breath, or your skin or nails become bluish.  · You have severe pain or stiffness in the neck.  · You develop a sudden headache, or pain in the face or ear.  · You have nausea or vomiting that you cannot control.  MAKE SURE YOU:   · Understand these instructions.  · Will watch your condition.  · Will get help right away if you are not doing well or get worse.     This information is not intended to replace advice given to you by your health care provider. Make sure you discuss any questions you have with your health care provider.     Document Released: 12/15/2001 Document  Revised: 01/08/2016 Document Reviewed: 03/18/2013  Elsevier Interactive Patient Education ©2016 [employer]

## 2018-02-20 NOTE — LETTER
February 20, 2018         Patient: Shana Dunn   YOB: 1965   Date of Visit: 2/20/2018           To Whom it May Concern:    Shana Dunn was seen in my clinic on 2/20/2018. Please excuse her from work 2/21-22-23.  May return sooner if able.    If you have any questions or concerns, please don't hesitate to call.        Sincerely,           Terrell Ashley P.A.-C.  Electronically Signed

## 2018-02-20 NOTE — PROGRESS NOTES
Subjective:      Shana Dunn is a 52 y.o. female who presents with Cough (chest congestion, sore throat, fever x1 day)            HPI  Chief Complaint   Patient presents with   • Cough     chest congestion, sore throat, fever x1 day       HPI:  Shana Dunn is a 52 y.o. female who presents with flulike symptoms within the last 24 hours. Just returned from [place] via the airport with lots of exposure to sick individuals. Cough, worsening chest congestion, sore throat, and fever. Has taken ibuprofen with little to no improvement. Did get flu vaccine this year. Concerned about possible influenza.  Patient denies  SOB, chest pain, palpitations,  or n/v/d.    No history of asthma or bronchitis. Does have history of pneumonia.  Past Medical History:   Diagnosis Date   • Arthritis     osteo to knee   • Heart burn     chronic   • High cholesterol    • Hypertension    • Pain 2/23/17    right knee   • Pneumonia 1975   • Psychiatric problem     anxiety       Past Surgical History:   Procedure Laterality Date   • KNEE ARTHROSCOPY Right 3/6/2017    Procedure: KNEE ARTHROSCOPY;  Surgeon: Mati Wagoner M.D.;  Location: SURGERY [facility];  Service:    • MEDIAL MENISCECTOMY Right 3/6/2017    Procedure: MEDIAL MENISCECTOMY - PARTIAL;  Surgeon: Mati Wagoner M.D.;  Location: SURGERY [facility];  Service:    • KNEE ARTHROSCOPY Left 5/18/16   • COLONOSCOPY  2015   • HYSTERECTOMY LAPAROSCOPY  2/2014   • CAITY BY LAPAROSCOPY  2001   • TUBAL LIGATION Bilateral 1999   • [id number] Bilateral 1982       History reviewed. No pertinent family history.  No pertinent family history.    Social History     Social History   • Marital status:      Spouse name: N/A   • Number of children: N/A   • Years of education: N/A     Occupational History   • Not on file.     Social History Main Topics   • Smoking status: Former Smoker     Packs/day: 0.50     Years: 20.00     Types: Cigarettes     Quit date: 1/30/2008   •  "Smokeless tobacco: Never Used   • Alcohol use Yes      Comment: 2 glasses per week   • Drug use: No   • Sexual activity: Yes     Partners: Male      Comment:      Other Topics Concern   • Not on file     Social History Narrative   • No narrative on file         Current Outpatient Prescriptions:   •  estradiol, 1 mg, Oral, DAILY, 2/6/2017  •  lisinopril, 5 mg, Oral, DAILY, 3/5/2017 at Unknown time  •  meloxicam, 15 mg, Oral, DAILY, 2/27/2017  •  hydroCHLOROthiazide, 25 mg, Oral, DAILY, 3/5/2017 at Unknown time  •  citalopram, 20 mg, Oral, DAILY, 3/5/2017 at Unknown time  •  Naltrexone-Bupropion HCl (CONTRAVE PO), 40 mg, Oral, DAILY, 2/27/2017  •  lansoprazole, 30 mg, Oral, DAILY, 3/5/2017 at Unknown time  •  vitamin D3, 1 Cap, Oral, DAILY, 2/27/2017  •  BIOTIN PO, 1,000 Units, Oral, DAILY, 2/27/2017  •  ALPRAZolam (XANAX PO), Take  by mouth as needed., 2/27/2017    Allergies   Allergen Reactions   • Penicillins Anaphylaxis   • Zithromax [Azithromycin] Hives and Swelling     Neck and tongue   • Crestor [Rosuvastatin Calcium] Hives   • Wheat Bran      Gastric distress and skin in mouth sloughs off        Review of Systems   Constitutional: Positive for chills, fever and malaise/fatigue.   HENT: Positive for congestion and sore throat.    Eyes: Negative.    Respiratory: Positive for cough.    Cardiovascular: Negative.    Gastrointestinal: Negative.    Genitourinary: Negative.    Musculoskeletal: Positive for back pain, joint pain and myalgias.   Skin: Negative.    Neurological: Positive for headaches.   Endo/Heme/Allergies: Negative.    Psychiatric/Behavioral: Negative.           Objective:     /76   Pulse 88   Temp 37.2 °C (99 °F)   Resp 14   Ht 1.753 m (5' 9\")   Wt 108.9 kg (240 lb)   SpO2 92%   Breastfeeding? No   BMI 35.44 kg/m²      Physical Exam       Nursing note and vital signs reviewed.    Constitutional:  Appropriately groomed, pleasant affect, well nourished, and in no acute " distress.    HEENT:  Head: Atruamatic, normocephalic.    Ears:  EAC with mild cerumen bilaterally, not erythematous.  TM’s pearly gray with cone of light present and umbo and malleolus visible bilaterally.  No bulging or fluid bubbles present in middle ear.    Eyes:  PERRLA, EOM's full, sclera white, conjunctiva not erythematous, and medial canthus without exudate bilaterally.    Nose:  Nares patent bilaterally.  Nasal mucosa edematous with clear rhinorrhea bilaterally.  Frontal and maxillary sinuses not tender to percussion.    Throat:  Oropharynx erythematous, with no enlargement of the palatine tonsils bilaterally with no exudates.    Posterior oropharynx with cobblestoning and clear to white post nasal drainage present.  Soft palate rises symmetrically bilaterally and uvula midline.  Neck supple, with mild proximal anterior cervical chain lymphadenopathy that is soft and mobile to palpation.      Lungs: Respiratory effort within normal limits. Lungs clear to auscultation bilaterally. No crackles or rhonchi noted.     Heart:  Regular rate and rhythm without rubs, murmurs, or gallops. Dorsalis pedis and radial pulses 2+ bilaterally. No lower extremity edema.    Muscle skeletal:  Gait and station wnl, non antalgic.    Derm:  No lesions or rashes. Overall good turgor pressure.     Psychiatric:  Normal judgement, mood and affect.      Assessment/Plan:     1. Viral syndrome  oseltamivir (TAMIFLU) 75 MG Cap    albuterol 108 (90 Base) MCG/ACT Aero Soln inhalation aerosol   2. Flu-like symptoms  POCT Influenza A/B    promethazine-codeine (PHENERGAN-CODEINE) 6.25-10 MG/5ML Syrup    albuterol (PROVENTIL) 2.5mg/3ml nebulizer solution 2.5 mg    oseltamivir (TAMIFLU) 75 MG Cap    albuterol 108 (90 Base) MCG/ACT Aero Soln inhalation aerosol   3. Chest tightness  albuterol (PROVENTIL) 2.5mg/3ml nebulizer solution 2.5 mg    albuterol 108 (90 Base) MCG/ACT Aero Soln inhalation aerosol      Patient presents with suspected  influenza. Symptoms started after trip to [place] is also sick exposures in the airport. Worsening body aches and tightness in the chest. On exam patient with temperature 90.9 and oxygen of 92% room air. On exam patient has tight lungs, coryza, erythematous oropharynx. Rapid influenza negative but cannot rule out false negative. Post albuterol nebulizer patient's oxygenation did improve to 100% room air, adequate, but fluctuated between 94-95. This was improved from initial 92. Given this, suspect reactive airway due to postnasal drip. Prescribed Tamiflu, cough medicine for bedtime use only, albuterol inhaler. Work note provided.    Patient was in agreement with this treatment plan and seemed to understand without barriers. All questions were encouraged and answered.  Reviewed signs and symptoms of when to seek emergency medical care.     Please note that this dictation was created using voice recognition software.  I have made every reasonable attempt to correct obvious errors, but I expect there are errors of clem and possibly content that I did not discover before finalizing the note.

## 2018-04-19 ENCOUNTER — HOSPITAL ENCOUNTER (OUTPATIENT)
Dept: LAB | Facility: MEDICAL CENTER | Age: 53
End: 2018-04-19
Attending: INTERNAL MEDICINE
Payer: COMMERCIAL

## 2018-04-19 LAB
CRP SERPL HS-MCNC: 2.47 MG/DL (ref 0–0.75)
RHEUMATOID FACT SER IA-ACNC: <10 IU/ML (ref 0–14)

## 2018-04-19 PROCEDURE — 86140 C-REACTIVE PROTEIN: CPT

## 2018-04-19 PROCEDURE — 86235 NUCLEAR ANTIGEN ANTIBODY: CPT

## 2018-04-19 PROCEDURE — 36415 COLL VENOUS BLD VENIPUNCTURE: CPT

## 2018-04-19 PROCEDURE — 86200 CCP ANTIBODY: CPT

## 2018-04-19 PROCEDURE — 86431 RHEUMATOID FACTOR QUANT: CPT

## 2018-04-21 LAB
CCP IGG SERPL-ACNC: 9 UNITS (ref 0–19)
SSA52 R0ENA AB IGG Q0420: 2 AU/ML (ref 0–40)
SSA60 R0ENA AB IGG Q0419: 0 AU/ML (ref 0–40)

## 2018-05-21 ENCOUNTER — HOSPITAL ENCOUNTER (OUTPATIENT)
Dept: RADIOLOGY | Facility: MEDICAL CENTER | Age: 53
End: 2018-05-21
Attending: INTERNAL MEDICINE
Payer: COMMERCIAL

## 2018-05-21 DIAGNOSIS — M25.561 RIGHT KNEE PAIN, UNSPECIFIED CHRONICITY: ICD-10-CM

## 2018-05-21 PROCEDURE — 73721 MRI JNT OF LWR EXTRE W/O DYE: CPT | Mod: RT

## 2018-06-08 ENCOUNTER — HOSPITAL ENCOUNTER (OUTPATIENT)
Dept: RADIOLOGY | Facility: MEDICAL CENTER | Age: 53
End: 2018-06-08
Attending: OBSTETRICS & GYNECOLOGY
Payer: COMMERCIAL

## 2018-06-08 DIAGNOSIS — R10.31 ABDOMINAL PAIN, RIGHT LOWER QUADRANT: ICD-10-CM

## 2018-06-08 PROCEDURE — 76830 TRANSVAGINAL US NON-OB: CPT

## 2018-06-20 ENCOUNTER — OFFICE VISIT (OUTPATIENT)
Dept: MEDICAL GROUP | Facility: MEDICAL CENTER | Age: 53
End: 2018-06-20
Payer: COMMERCIAL

## 2018-06-20 VITALS
DIASTOLIC BLOOD PRESSURE: 78 MMHG | OXYGEN SATURATION: 94 % | WEIGHT: 243.8 LBS | SYSTOLIC BLOOD PRESSURE: 114 MMHG | RESPIRATION RATE: 16 BRPM | HEIGHT: 69 IN | HEART RATE: 93 BPM | BODY MASS INDEX: 36.11 KG/M2 | TEMPERATURE: 98.2 F

## 2018-06-20 DIAGNOSIS — E78.5 DYSLIPIDEMIA: ICD-10-CM

## 2018-06-20 DIAGNOSIS — M25.50 ARTHRALGIA, UNSPECIFIED JOINT: ICD-10-CM

## 2018-06-20 DIAGNOSIS — R73.03 PRE-DIABETES: ICD-10-CM

## 2018-06-20 DIAGNOSIS — L90.0 LICHEN SCLEROSUS: ICD-10-CM

## 2018-06-20 DIAGNOSIS — K21.9 GASTROESOPHAGEAL REFLUX DISEASE, ESOPHAGITIS PRESENCE NOT SPECIFIED: ICD-10-CM

## 2018-06-20 PROBLEM — S83.232A COMPLEX TEAR OF MEDIAL MENISCUS OF LEFT KNEE AS CURRENT INJURY: Status: RESOLVED | Noted: 2017-03-06 | Resolved: 2018-06-20

## 2018-06-20 LAB
HBA1C MFR BLD: 5.9 % (ref ?–5.8)
INT CON NEG: NEGATIVE
INT CON POS: POSITIVE

## 2018-06-20 PROCEDURE — 99213 OFFICE O/P EST LOW 20 MIN: CPT | Performed by: NURSE PRACTITIONER

## 2018-06-20 PROCEDURE — 83036 HEMOGLOBIN GLYCOSYLATED A1C: CPT | Performed by: NURSE PRACTITIONER

## 2018-06-20 RX ORDER — CLOBETASOL PROPIONATE 0.5 MG/G
1 CREAM TOPICAL 2 TIMES DAILY
COMMUNITY
End: 2022-08-29

## 2018-06-20 RX ORDER — LORATADINE 10 MG/1
10 TABLET ORAL DAILY
Qty: 30 TAB | COMMUNITY
Start: 2018-06-20 | End: 2023-10-19

## 2018-06-20 RX ORDER — OMEPRAZOLE 20 MG/1
20 CAPSULE, DELAYED RELEASE ORAL DAILY
Qty: 30 CAP | Refills: 3 | Status: SHIPPED | OUTPATIENT
Start: 2018-06-20 | End: 2018-07-20

## 2018-06-20 ASSESSMENT — PATIENT HEALTH QUESTIONNAIRE - PHQ9: CLINICAL INTERPRETATION OF PHQ2 SCORE: 0

## 2018-06-20 NOTE — LETTER
[facility]  SHREYA David.P.R.N.  [address]  [place] [address]  Fax: [phone]   Authorization for Release/Disclosure of   Protected Health Information   Name: VIDHI LANG : 1965 SSN: [id number]   Address: [address] [place] [address] [place] [address] Phone:    [phone] (home)    I authorize the entity listed below to release/disclose the PHI below to:   [facility]/Radha Dacotsa A.P.R.N. and SHREYA David.P.R.LEXUS.   Provider or Entity Name:  Carie Truong   Address   City, State, Zip   Phone:      Fax:     Reason for request: continuity of care   Information to be released:    [  ] LAST COLONOSCOPY,  including any PATH REPORT and follow-up  [  ] LAST FIT/COLOGUARD RESULT [  ] LAST DEXA  [  ] LAST MAMMOGRAM  [  ] LAST PAP  [  ] LAST LABS [  ] RETINA EXAM REPORT  [  ] IMMUNIZATION RECORDS  [x  ] Release all info      [  ] Check here and initial the line next to each item to release ALL health information INCLUDING  _____ Care and treatment for drug and / or alcohol abuse  _____ HIV testing, infection status, or AIDS  _____ Genetic Testing    DATES OF SERVICE OR TIME PERIOD TO BE DISCLOSED: _____________  I understand and acknowledge that:  * This Authorization may be revoked at any time by you in writing, except if your health information has already been used or disclosed.  * Your health information that will be used or disclosed as a result of you signing this authorization could be re-disclosed by the recipient. If this occurs, your re-disclosed health information may no longer be protected by State or Federal laws.  * You may refuse to sign this Authorization. Your refusal will not affect your ability to obtain treatment.  * This Authorization becomes effective upon signing and will  on (date) __________.      If no date is indicated, this Authorization will  one (1) year from the signature date.    Name: Vidhi Lang    Signature:   Date:      6/20/2018       PLEASE FAX REQUESTED RECORDS BACK TO: [phone]

## 2018-06-20 NOTE — PROGRESS NOTES
"CC: establish care, joint pain      Shana Dunn is a 52 y.o. female here to establish care and to discuss the evaluation and management of:      Joint pain  Patient states that she has been suffering for approximately 20 years with joint pain. States that she has joint pain in her hips, ankles and her knees. Patient states that when she is having this pain it is difficult for her to walk and very painful for her. Patient states that she can have these episodes to where it lasts just a few months. Her last episode lasted about 8 months. When she is not having these episodes and they have subsided that she has just normal joint pain. Patient states that the last 3 years she is paid attention to the cycles now the last several months and then she will be symptom free. She states that she went to endocrinology and was worked up as she thought she had lupus however that was negative. Rheumatoid factor was also negative. She is states that she does have an elevated CRP in which she thought might be helpful in diagnosing. However she states that she has been told many times that she just needs to lose some weight. Patient states that she does know that she is prediabetic. Most recent A1c was 5.9%. Denies any fevers however states she does have some night sweats occasionally. Patient states that during these episodes she has a lot of stomach issues where she alternates between constipation and diarrhea. States that sometimes he gets his white bumps on her hands and her feet that are dry that come and go they are not painful but they can be itchy. Has not tried anything for them.     Rash between breasts  Patient states that she has these \"crop circles\" in between her breasts and has had them for approximately 2 years. States that she uses a fungal cream on them to see if they will disappear. Patient states that they seem to last quite a while. Denies any itching, pain, drainage, exacerbation.    Acid reflux   Patient " states that she does suffer from some acid reflux and she takes omeprazole for this. Denies any black or bloody stools.    Lichen sclerosis  Patient states that she was recently diagnosed with this and she's been taking clobetasol for this.    ROS:  Denies any Headache, Blurred Vision, Confusion Chest pain,  Shortness of breath,  Abdominal pain, Changes of bowel or bladder, Lower ext edema, Fevers, Nights sweats, Weight Changes, Focal weakness or numbness.  All other systems are negative. +joint pain, rash between breast x2 years      Current Outpatient Prescriptions:   •  loratadine (CLARITIN) 10 MG Tab, Take 1 Tab by mouth every day., Disp: 30 Tab, Rfl:   •  omeprazole (PRILOSEC) 20 MG delayed-release capsule, Take 1 Cap by mouth every day., Disp: 30 Cap, Rfl: 3  •  lisinopril (PRINIVIL) 5 MG Tab, Take 5 mg by mouth every day., Disp: , Rfl:   •  hydrochlorothiazide (HYDRODIURIL) 25 MG Tab, Take 25 mg by mouth every day., Disp: , Rfl:   •  citalopram (CELEXA) 20 MG Tab, Take 20 mg by mouth every day., Disp: , Rfl:   •  lansoprazole (PREVACID) 30 MG CAPSULE DELAYED RELEASE, Take 30 mg by mouth every day., Disp: , Rfl:   •  Cholecalciferol (VITAMIN D3) 5000 UNITS Cap, Take 1 Cap by mouth every day., Disp: , Rfl:   •  BIOTIN PO, Take 1,000 Units by mouth every day., Disp: , Rfl:   •  ALPRAZolam (XANAX PO), Take  by mouth as needed., Disp: , Rfl:   •  clobetasol (TEMOVATE) 0.05 % Cream, Apply 1 g to affected area(s) 2 Times a Day., Disp: , Rfl:     Allergies   Allergen Reactions   • Penicillins Anaphylaxis   • Zithromax [Azithromycin] Hives and Swelling     Neck and tongue   • Latex Rash   • Crestor  [Rosuvastatin Calcium] Rash   • Crestor [Rosuvastatin Calcium] Hives   • Sulfa Drugs Anaphylaxis   • Wheat Bran      Gastric distress and skin in mouth sloughs off       Past Medical History:   Diagnosis Date   • Arthritis     bilateral knee   • Complex tear of medial meniscus of left knee as current injury 3/6/2017   •  "Heart burn     chronic   • High cholesterol    • Hypertension    • Pain 2/23/17    right knee   • Pneumonia 1975   • Psychiatric problem     anxiety     Past Surgical History:   Procedure Laterality Date   • KNEE ARTHROSCOPY Right 3/6/2017    Procedure: KNEE ARTHROSCOPY;  Surgeon: Mati Wagoner M.D.;  Location: SURGERY [facility];  Service:    • MEDIAL MENISCECTOMY Right 3/6/2017    Procedure: MEDIAL MENISCECTOMY - PARTIAL;  Surgeon: Mati Wagoner M.D.;  Location: SURGERY [facility];  Service:    • KNEE ARTHROSCOPY Left 5/18/16   • COLONOSCOPY  2015   • HYSTERECTOMY LAPAROSCOPY  02/2014    ovaries spared   • CAITY BY LAPAROSCOPY  2001   • TUBAL LIGATION Bilateral 1999   • [id number] Bilateral 1982     Family History   Problem Relation Age of Onset   • Heart Failure Mother    • Cancer Father      prostate   • Other Sister      MS   • Cancer Maternal Grandmother      Leukemia     Social History     Social History   • Marital status:      Spouse name: N/A   • Number of children: N/A   • Years of education: N/A     Occupational History   • Not on file.     Social History Main Topics   • Smoking status: Former Smoker     Packs/day: 0.50     Years: 20.00     Types: Cigarettes     Quit date: 1/30/2008   • Smokeless tobacco: Never Used   • Alcohol use Yes      Comment: 2 glasses per week   • Drug use: No   • Sexual activity: Yes     Partners: Male      Comment:      Other Topics Concern   • Not on file     Social History Narrative   • No narrative on file       Objective:     Vitals: /78   Pulse 93   Temp 36.8 °C (98.2 °F)   Resp 16   Ht 1.753 m (5' 9\")   Wt 110.6 kg (243 lb 12.8 oz)   SpO2 94%   BMI 36.00 kg/m²       General: Alert, pleasant, NAD  HEENT:  Normocephalic.    Heart:  Regular rate and rhythm.  S1 and S2 normal.  No murmurs appreciated.  Respiratory:  Normal respiratory effort.  Clear to auscultation bilaterally.    Skin:  Warm, dry, approximately 3-4 quarter-sized " annular macules between breasts no erythema, no drainage, no pustules, no papules.? Fungal  Musculoskeletal:  Gait is normal.  Moves all extremities well.  Extremities:   No leg edema.  Neurological: No tremors, sensation grossly intact  Psych:  Affect/mood is normal, judgement is good, memory is intact, grooming is appropriate.      Assessment and Plan.   52 y.o. female to establish care and discuss following      1. Arthralgia, unspecified joint  Chronic. Intermittent episodes and length of time between her periods of extreme joint pain and limited mobility due to joint pain. Patient states she's been worked up by endocrinology, no specific diagnosis has been made at this point, rheumatoid ruled out, lupus ruled out. CRP was 2.47. Deferred to endocrinology for further workup.  - TSH WITH REFLEX TO FT4; Future  - URIC ACID, SERUM  - COMP METABOLIC PANEL; Future    2. Pre-diabetes  A1c in clinic today was 5.9% which was the same as the most recent one. Struggles with weight. Discussed continuing to monitor for increase in her A1c. Discussed weight loss. May refer to health improvement plan.  - POCT  A1C  - TSH WITH REFLEX TO FT4; Future  - COMP METABOLIC PANEL; Future    3. Gastroesophageal reflux disease, esophagitis presence not specified  Chronic. Continue omeprazole. Encouraged patient to have small frequent meals throughout the day instead of large meals. Avoid excess caffeine, citrus foods, alcohol, spicy foods, chocolate, or mint. Avoid eating late at night and/or a large meal prior to laying down.    4. Dyslipidemia  Last lipid panel was November 2017 with a total cholesterol 242, treated with Zyrtec to 92, HDL 51. Currently not on any medications. ASCVD 2.2%.Repeat    5. Lichen sclerosus   Chronic. Patient was recently diagnosed with this. Currently using clobetasol.      Health Maintenance:     Return in about 1 month (around 7/20/2018).    I have placed the above orders and discussed them with an approved  delegating provider.  The MA is performing the below orders under the direction of Dr. Pasquale FIELD

## 2018-06-20 NOTE — LETTER
[facility]  SONIA DavidPMarkRMarkN.  [address]  [place] [address]  Fax: [phone]   Authorization for Release/Disclosure of   Protected Health Information   Name: VIDHI DUNN : 1965 SSN: [id number]   Address: [address] [place] [address] [place] [address] Phone:    [phone] (home)    I authorize the entity listed below to release/disclose the PHI below to:   [facility]/SHREYA David.P.R.VIRGILIO and EYAD DavidRLISANDRO   Provider or Entity Name:  [facility]   Address   City, State, [facility]   Phone:      Fax:     Reason for request: continuity of care   Information to be released:    [ x ] LAST COLONOSCOPY,  including any PATH REPORT and follow-up  [  ] LAST FIT/COLOGUARD RESULT [  ] LAST DEXA  [  ] LAST MAMMOGRAM  [  ] LAST PAP  [  ] LAST LABS [  ] RETINA EXAM REPORT  [  ] IMMUNIZATION RECORDS  [  ] Release all info      [  ] Check here and initial the line next to each item to release ALL health information INCLUDING  _____ Care and treatment for drug and / or alcohol abuse  _____ HIV testing, infection status, or AIDS  _____ Genetic Testing    DATES OF SERVICE OR TIME PERIOD TO BE DISCLOSED: _____________  I understand and acknowledge that:  * This Authorization may be revoked at any time by you in writing, except if your health information has already been used or disclosed.  * Your health information that will be used or disclosed as a result of you signing this authorization could be re-disclosed by the recipient. If this occurs, your re-disclosed health information may no longer be protected by State or Federal laws.  * You may refuse to sign this Authorization. Your refusal will not affect your ability to obtain treatment.  * This Authorization becomes effective upon signing and will  on (date) __________.      If no date is indicated, this Authorization will  one (1) year from the signature date.    Name: Vidhi Dunn    Signature:   Date:          6/20/2018       PLEASE FAX REQUESTED RECORDS BACK TO: [phone]

## 2018-06-20 NOTE — LETTER
[facility]  SONIA DavidPMarkRMarkN.  [address]  [place] [address]  Fax: [phone]   Authorization for Release/Disclosure of   Protected Health Information   Name: VIDHI LANG : 1965 SSN: [id number]   Address: [address] [place] [address] [place] [address] Phone:    [phone] (home)    I authorize the entity listed below to release/disclose the PHI below to:   [facility]/SHREYA David.P.R.VIRGILIO and EYAD DavidRLISANDRO   Provider or Entity Name:  [facility]   City, State, Zip   Phone:      Fax:     Reason for request: continuity of care   Information to be released:    [  ] LAST COLONOSCOPY,  including any PATH REPORT and follow-up  [  ] LAST FIT/COLOGUARD RESULT [  ] LAST DEXA  [  ] LAST MAMMOGRAM  [x  ] LAST PAP  [  ] LAST LABS [  ] RETINA EXAM REPORT  [  ] IMMUNIZATION RECORDS  [  ] Release all info      [  ] Check here and initial the line next to each item to release ALL health information INCLUDING  _____ Care and treatment for drug and / or alcohol abuse  _____ HIV testing, infection status, or AIDS  _____ Genetic Testing    DATES OF SERVICE OR TIME PERIOD TO BE DISCLOSED: _____________  I understand and acknowledge that:  * This Authorization may be revoked at any time by you in writing, except if your health information has already been used or disclosed.  * Your health information that will be used or disclosed as a result of you signing this authorization could be re-disclosed by the recipient. If this occurs, your re-disclosed health information may no longer be protected by State or Federal laws.  * You may refuse to sign this Authorization. Your refusal will not affect your ability to obtain treatment.  * This Authorization becomes effective upon signing and will  on (date) __________.      If no date is indicated, this Authorization will  one (1) year from the signature date.    Name: Vidhi Lang    Signature:   Date:          6/20/2018       PLEASE FAX REQUESTED RECORDS BACK TO: [phone]

## 2018-07-16 ENCOUNTER — HOSPITAL ENCOUNTER (OUTPATIENT)
Dept: LAB | Facility: MEDICAL CENTER | Age: 53
End: 2018-07-16
Attending: NURSE PRACTITIONER
Payer: COMMERCIAL

## 2018-07-16 DIAGNOSIS — R73.03 PRE-DIABETES: ICD-10-CM

## 2018-07-16 DIAGNOSIS — M25.50 ARTHRALGIA, UNSPECIFIED JOINT: ICD-10-CM

## 2018-07-16 LAB
ALBUMIN SERPL BCP-MCNC: 4.7 G/DL (ref 3.2–4.9)
ALBUMIN/GLOB SERPL: 2 G/DL
ALP SERPL-CCNC: 83 U/L (ref 30–99)
ALT SERPL-CCNC: 27 U/L (ref 2–50)
ANION GAP SERPL CALC-SCNC: 10 MMOL/L (ref 0–11.9)
AST SERPL-CCNC: 16 U/L (ref 12–45)
BILIRUB SERPL-MCNC: 0.7 MG/DL (ref 0.1–1.5)
BUN SERPL-MCNC: 17 MG/DL (ref 8–22)
CALCIUM SERPL-MCNC: 10.3 MG/DL (ref 8.5–10.5)
CHLORIDE SERPL-SCNC: 103 MMOL/L (ref 96–112)
CO2 SERPL-SCNC: 28 MMOL/L (ref 20–33)
CREAT SERPL-MCNC: 0.75 MG/DL (ref 0.5–1.4)
GLOBULIN SER CALC-MCNC: 2.4 G/DL (ref 1.9–3.5)
GLUCOSE SERPL-MCNC: 111 MG/DL (ref 65–99)
POTASSIUM SERPL-SCNC: 3.9 MMOL/L (ref 3.6–5.5)
PROT SERPL-MCNC: 7.1 G/DL (ref 6–8.2)
SODIUM SERPL-SCNC: 141 MMOL/L (ref 135–145)
TSH SERPL DL<=0.005 MIU/L-ACNC: 1.44 UIU/ML (ref 0.38–5.33)
URATE SERPL-MCNC: 7.5 MG/DL (ref 1.9–8.2)

## 2018-07-16 PROCEDURE — 36415 COLL VENOUS BLD VENIPUNCTURE: CPT

## 2018-07-16 PROCEDURE — 84443 ASSAY THYROID STIM HORMONE: CPT

## 2018-07-16 PROCEDURE — 84550 ASSAY OF BLOOD/URIC ACID: CPT

## 2018-07-16 PROCEDURE — 80053 COMPREHEN METABOLIC PANEL: CPT

## 2018-07-20 ENCOUNTER — OFFICE VISIT (OUTPATIENT)
Dept: MEDICAL GROUP | Facility: MEDICAL CENTER | Age: 53
End: 2018-07-20
Payer: COMMERCIAL

## 2018-07-20 VITALS
SYSTOLIC BLOOD PRESSURE: 116 MMHG | OXYGEN SATURATION: 95 % | WEIGHT: 257.8 LBS | RESPIRATION RATE: 16 BRPM | BODY MASS INDEX: 38.18 KG/M2 | DIASTOLIC BLOOD PRESSURE: 80 MMHG | HEIGHT: 69 IN | HEART RATE: 85 BPM | TEMPERATURE: 97.5 F

## 2018-07-20 DIAGNOSIS — R73.03 PRE-DIABETES: ICD-10-CM

## 2018-07-20 DIAGNOSIS — F41.0 ANXIETY ATTACK: ICD-10-CM

## 2018-07-20 DIAGNOSIS — K21.9 GASTROESOPHAGEAL REFLUX DISEASE, ESOPHAGITIS PRESENCE NOT SPECIFIED: ICD-10-CM

## 2018-07-20 DIAGNOSIS — E79.0 ELEVATED URIC ACID IN BLOOD: ICD-10-CM

## 2018-07-20 PROCEDURE — 99213 OFFICE O/P EST LOW 20 MIN: CPT | Performed by: NURSE PRACTITIONER

## 2018-07-20 RX ORDER — ALPRAZOLAM 0.25 MG/1
0.5 TABLET ORAL PRN
Qty: 30 TAB | Refills: 1 | Status: SHIPPED | OUTPATIENT
Start: 2018-07-20 | End: 2020-02-07 | Stop reason: SDUPTHER
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
## 2018-10-10 ENCOUNTER — OFFICE VISIT (OUTPATIENT)
Dept: MEDICAL GROUP | Facility: MEDICAL CENTER | Age: 53
End: 2018-10-10
Payer: COMMERCIAL

## 2018-10-10 VITALS
SYSTOLIC BLOOD PRESSURE: 110 MMHG | OXYGEN SATURATION: 96 % | DIASTOLIC BLOOD PRESSURE: 80 MMHG | WEIGHT: 250 LBS | TEMPERATURE: 97.9 F | RESPIRATION RATE: 16 BRPM | HEIGHT: 69 IN | HEART RATE: 83 BPM | BODY MASS INDEX: 37.03 KG/M2

## 2018-10-10 DIAGNOSIS — R51.9 SCALP TENDERNESS: ICD-10-CM

## 2018-10-10 DIAGNOSIS — R21 RASH: ICD-10-CM

## 2018-10-10 DIAGNOSIS — L71.9 ROSACEA: ICD-10-CM

## 2018-10-10 PROCEDURE — 99213 OFFICE O/P EST LOW 20 MIN: CPT | Performed by: NURSE PRACTITIONER

## 2018-10-10 RX ORDER — VALACYCLOVIR HYDROCHLORIDE 500 MG/1
500 TABLET, FILM COATED ORAL 2 TIMES DAILY
Qty: 20 TAB | Refills: 1 | Status: SHIPPED | OUTPATIENT
Start: 2018-10-10 | End: 2020-07-09

## 2018-10-10 RX ORDER — METRONIDAZOLE 7.5 MG/G
1 GEL TOPICAL 2 TIMES DAILY
Qty: 1 TUBE | Refills: 3 | Status: SHIPPED | OUTPATIENT
Start: 2018-10-10 | End: 2021-03-29

## 2018-10-10 NOTE — PROGRESS NOTES
"cc:  Rash/rosacea      Subjective:     HPI:     Shana Dunn is a 53 y.o. female here to discuss the evaluation and management of:    Scalp pain  Patient is here today as she has noticed for the last 2 weeks she has had some scalp tenderness and itchiness.  States that when she gets hot it will feel itchy and \"prickly.\" Denies any new foods, shampoos, hair products or extended exposure to sun. No recent hair dyes applied.     Rosacea   Patient states she still continues to have the red, raised bumps around her nose.  States that she was given clobetasol cream from her GYN which only helped minimally.  States that it can get sore and dry.  She also states she has been having some pimples on her lower back that are tender.        ROS:  Denies any Headache, Blurred Vision, Confusion, Chest pain,  Shortness of breath,  Abdominal pain, Changes of bowel or bladder, Lower ext edema, Fevers, Nights sweats, Weight Changes, Focal weakness or numbness.  And all other systems are negative.  Rash ,scalp tenderness        Current Outpatient Prescriptions:   •  metronidazole (METROGEL) 0.75 % gel, Apply 1 g to affected area(s) 2 times a day., Disp: 1 Tube, Rfl: 3  •  valACYclovir (VALTREX) 500 MG Tab, Take 1 Tab by mouth 2 times a day., Disp: 20 Tab, Rfl: 1  •  loratadine (CLARITIN) 10 MG Tab, Take 1 Tab by mouth every day., Disp: 30 Tab, Rfl:   •  lisinopril (PRINIVIL) 5 MG Tab, Take 5 mg by mouth every day., Disp: , Rfl:   •  hydrochlorothiazide (HYDRODIURIL) 25 MG Tab, Take 25 mg by mouth every day., Disp: , Rfl:   •  citalopram (CELEXA) 20 MG Tab, Take 20 mg by mouth every day., Disp: , Rfl:   •  lansoprazole (PREVACID) 30 MG CAPSULE DELAYED RELEASE, Take 30 mg by mouth every day., Disp: , Rfl:   •  Cholecalciferol (VITAMIN D3) 5000 UNITS Cap, Take 1 Cap by mouth every day., Disp: , Rfl:   •  BIOTIN PO, Take 1,000 Units by mouth every day., Disp: , Rfl:   •  clobetasol (TEMOVATE) 0.05 % Cream, Apply 1 g to affected area(s) " 2 Times a Day., Disp: , Rfl:     Allergies   Allergen Reactions   • Penicillins Anaphylaxis   • Zithromax [Azithromycin] Hives and Swelling     Neck and tongue   • Latex Rash   • Crestor  [Rosuvastatin Calcium] Rash   • Crestor [Rosuvastatin Calcium] Hives   • Sulfa Drugs Anaphylaxis   • Wheat Bran      Gastric distress and skin in mouth sloughs off       Past Medical History:   Diagnosis Date   • Arthritis     bilateral knee   • Complex tear of medial meniscus of left knee as current injury 3/6/2017   • Heart burn     chronic   • High cholesterol    • Hypertension    • Pain 2/23/17    right knee   • Pneumonia 1975   • Psychiatric problem     anxiety     Past Surgical History:   Procedure Laterality Date   • KNEE ARTHROSCOPY Right 3/6/2017    Procedure: KNEE ARTHROSCOPY;  Surgeon: Mati Wagoner M.D.;  Location: SURGERY [facility];  Service:    • MEDIAL MENISCECTOMY Right 3/6/2017    Procedure: MEDIAL MENISCECTOMY - PARTIAL;  Surgeon: Mati Wagoner M.D.;  Location: SURGERY [facility];  Service:    • KNEE ARTHROSCOPY Left 5/18/16   • COLONOSCOPY  2015   • HYSTERECTOMY LAPAROSCOPY  02/2014    ovaries spared   • CAITY BY LAPAROSCOPY  2001   • TUBAL LIGATION Bilateral 1999   • [id number] Bilateral 1982     Family History   Problem Relation Age of Onset   • Heart Failure Mother    • Cancer Father         prostate   • Other Sister         MS   • Cancer Maternal Grandmother         Leukemia     Social History     Social History   • Marital status:      Spouse name: N/A   • Number of children: N/A   • Years of education: N/A     Occupational History   • Not on file.     Social History Main Topics   • Smoking status: Former Smoker     Packs/day: 0.50     Years: 20.00     Types: Cigarettes     Quit date: 1/30/2008   • Smokeless tobacco: Never Used   • Alcohol use Yes      Comment: 2 glasses per week   • Drug use: No   • Sexual activity: Yes     Partners: Male      Comment:      Other Topics Concern  "  • Not on file     Social History Narrative   • No narrative on file       Objective:     Vitals: /80 (BP Location: Right arm, Patient Position: Sitting)   Pulse 83   Temp 36.6 °C (97.9 °F)   Resp 16   Ht 1.753 m (5' 9\")   Wt 113.4 kg (250 lb)   SpO2 96%   BMI 36.92 kg/m²    General: Alert, pleasant, NAD  HEENT: Normocephalic.    Skin: Warm, dry, no rashes. Maculopapular, erythematous area around nose. Few small papules on lower back (x 3) erythematous scalp. No lesions  Musculoskeletal: Gait is normal.  Moves all extremities well.  Extremities: No leg edema. No discoloration  Neurological: No tremors, sensation grossly intact  Psych:  Affect/mood is normal, judgement is good, memory is intact, grooming is appropriate.    Assessment/Plan:     Shana was seen today for rash.    Diagnoses and all orders for this visit:    Rash  Scalp tenderness  New onset over the last two weeks of scalp tenderness and redness. No reported new products or irritants applied. No visible lesions present just erythematous.   -     valACYclovir (VALTREX) 500 MG Tab; Take 1 Tab by mouth 2 times a day.  -     REFERRAL TO DERMATOLOGY    Rosacea  Not improving. Will trial metrogel twice daily. Avoid alcohol and tobacco, spicy foods, sun exposure and reduce stress.  -     metronidazole (METROGEL) 0.75 % gel; Apply 1 g to affected area(s) 2 times a day.  -     REFERRAL TO DERMATOLOGY      Return if symptoms worsen or fail to improve.        Radha FIELD  "

## 2018-10-22 RX ORDER — CITALOPRAM 20 MG/1
20 TABLET ORAL DAILY
Qty: 30 TAB | Refills: 6 | Status: SHIPPED | OUTPATIENT
Start: 2018-10-22 | End: 2019-05-21 | Stop reason: SDUPTHER

## 2018-10-22 RX ORDER — LISINOPRIL 5 MG/1
5 TABLET ORAL DAILY
Qty: 30 TAB | Refills: 6 | Status: SHIPPED | OUTPATIENT
Start: 2018-10-22 | End: 2019-07-08 | Stop reason: SDUPTHER

## 2018-10-22 RX ORDER — HYDROCHLOROTHIAZIDE 25 MG/1
25 TABLET ORAL DAILY
Qty: 30 TAB | Refills: 6 | Status: SHIPPED | OUTPATIENT
Start: 2018-10-22 | End: 2019-07-08 | Stop reason: SDUPTHER

## 2019-03-26 ENCOUNTER — OFFICE VISIT (OUTPATIENT)
Dept: MEDICAL GROUP | Facility: MEDICAL CENTER | Age: 54
End: 2019-03-26
Payer: COMMERCIAL

## 2019-03-26 VITALS
RESPIRATION RATE: 14 BRPM | BODY MASS INDEX: 37.77 KG/M2 | TEMPERATURE: 97.8 F | WEIGHT: 255 LBS | OXYGEN SATURATION: 97 % | SYSTOLIC BLOOD PRESSURE: 132 MMHG | DIASTOLIC BLOOD PRESSURE: 70 MMHG | HEART RATE: 72 BPM | HEIGHT: 69 IN

## 2019-03-26 DIAGNOSIS — J30.89 ALLERGIC RHINITIS DUE TO OTHER ALLERGIC TRIGGER, UNSPECIFIED SEASONALITY: ICD-10-CM

## 2019-03-26 DIAGNOSIS — H69.93 EUSTACHIAN TUBE DYSFUNCTION, BILATERAL: ICD-10-CM

## 2019-03-26 DIAGNOSIS — H92.02 LEFT EAR PAIN: ICD-10-CM

## 2019-03-26 DIAGNOSIS — R42 VERTIGO: ICD-10-CM

## 2019-03-26 PROCEDURE — 99214 OFFICE O/P EST MOD 30 MIN: CPT | Performed by: NURSE PRACTITIONER

## 2019-03-26 RX ORDER — MECLIZINE HYDROCHLORIDE 25 MG/1
12.5-25 TABLET ORAL 3 TIMES DAILY PRN
Qty: 30 TAB | Refills: 2 | Status: SHIPPED | OUTPATIENT
Start: 2019-03-26 | End: 2020-07-09

## 2019-03-26 ASSESSMENT — PATIENT HEALTH QUESTIONNAIRE - PHQ9: CLINICAL INTERPRETATION OF PHQ2 SCORE: 0

## 2019-03-26 NOTE — PROGRESS NOTES
CC: Otalgia (left ear x  night. With left facial swelling. Dizziness. No fevers or other symptoms. Has had issue with left eye lately where it will swelling in the middle of night, have a little goop in Am and then resolves. )        HPI:     Shana is a [facility] Patient, all problems are new to me today, presents today for the followin. Left ear pain  Here today stating that on  night she began having some left ear pain which is been fairly consistent.  Also associated with some feeling of heaviness and swelling around the left side of the face.  She denies any fevers associated cough sore throat or chest pain.  She states intermittently for a year sometimes she will have some swelling under her left eye with associated dry crust in the morning.    She does have a history of allergies, currently takes Claritin daily.  Previously was using Dymista.    2. Vertigo  States that she has known recurrent vertigo and she feels like it is starting to occur again now that her left ear is bothering her.  Symptoms are currently very mild.  Previously using meclizine for this        Current Outpatient Prescriptions   Medication Sig Dispense Refill   • meclizine (ANTIVERT) 25 MG Tab Take 0.5-1 Tabs by mouth 3 times a day as needed for Dizziness or Vertigo. 30 Tab 2   • lisinopril (PRINIVIL) 5 MG Tab Take 1 Tab by mouth every day. 30 Tab 6   • hydroCHLOROthiazide (HYDRODIURIL) 25 MG Tab Take 1 Tab by mouth every day. 30 Tab 6   • citalopram (CELEXA) 20 MG Tab Take 1 Tab by mouth every day. 30 Tab 6   • metronidazole (METROGEL) 0.75 % gel Apply 1 g to affected area(s) 2 times a day. 1 Tube 3   • valACYclovir (VALTREX) 500 MG Tab Take 1 Tab by mouth 2 times a day. 20 Tab 1   • loratadine (CLARITIN) 10 MG Tab Take 1 Tab by mouth every day. 30 Tab    • clobetasol (TEMOVATE) 0.05 % Cream Apply 1 g to affected area(s) 2 Times a Day.     • lansoprazole (PREVACID) 30 MG CAPSULE DELAYED RELEASE Take 30 mg by mouth every  "day.     • Cholecalciferol (VITAMIN D3) 5000 UNITS Cap Take 1 Cap by mouth every day.     • BIOTIN PO Take 1,000 Units by mouth every day.       No current facility-administered medications for this visit.      Social History   Substance Use Topics   • Smoking status: Former Smoker     Packs/day: 0.50     Years: 20.00     Types: Cigarettes     Quit date: 1/30/2008   • Smokeless tobacco: Never Used   • Alcohol use Yes      Comment: 2 glasses per week     I reviewed patients allergies, problem list and medications today in [facility].    ROS: Any/all pertinent positives listed in the HPI, otherwise all others reviewed are negative today.      /70 (BP Location: Right arm, Patient Position: Sitting, BP Cuff Size: Adult)   Pulse 72   Temp 36.6 °C (97.8 °F) (Temporal)   Resp 14   Ht 1.753 m (5' 9\")   Wt 115.7 kg (255 lb)   SpO2 97%   BMI 37.66 kg/m²      Exam:    Gen: Alert and oriented, No apparent distress. WDWN  Psych: A+Ox3, normal affect and mood  Skin: Warm, dry and intact. Good turgor   No rashes in visible areas.  Eye: Conjunctiva clear-bilaterally.  No visible crust or drainage., lids normal  ENMT: Lips without lesions, good dentition   Oropharynx clear.  Bilateral TMs nonerythematous and intact.  No pain with palpation of the frontal or maxillary sinuses bilaterally.  Mild to moderate erythema bilateral nasal turbinates.  Neck: No Lymphadenopathy, Thyromegaly, Bruits.   Trachea midline, no masses  Lungs: Clear to auscultation bilaterally, no rales or rhonchi   Unlabored respiratory effort.   CV: Regular rate and rhythm, S1, S2. No murmurs.   No Edema  Normal gait      Assessment and Plan.   53 y.o. female with the following issues.    1. Left ear pain/Allergic rhinitis due to other allergic trigger, unspecified seasonality/Eustachian tube dysfunction, bilateral  Clinically stable.  Restart her Dymista which she states she has at home.  Discussed she can do Tylenol/ibuprofen/heat pack for her left ear.  " Discussed if her pain worsens, has change in facial pain, pain radiates to teeth/fever cough etc. she will notify us and at that point we can send in antibiotics.  Continue her antihistamine.  She may benefit from doing a catherine pot rinse and has done these at home before.  Patient verbalizes understanding on plan of care.    2. Vertigo  Clinically stable.  Treat her eustachian tube dysfunction.  Meclizine if needed.  Was given some home vertigo exercises to try  - meclizine (ANTIVERT) 25 MG Tab; Take 0.5-1 Tabs by mouth 3 times a day as needed for Dizziness or Vertigo.  Dispense: 30 Tab; Refill: 2

## 2019-04-25 ENCOUNTER — OFFICE VISIT (OUTPATIENT)
Dept: MEDICAL GROUP | Facility: MEDICAL CENTER | Age: 54
End: 2019-04-25
Payer: COMMERCIAL

## 2019-04-25 VITALS
WEIGHT: 255 LBS | HEIGHT: 69 IN | TEMPERATURE: 98.2 F | HEART RATE: 79 BPM | OXYGEN SATURATION: 95 % | BODY MASS INDEX: 37.77 KG/M2 | RESPIRATION RATE: 16 BRPM | DIASTOLIC BLOOD PRESSURE: 78 MMHG | SYSTOLIC BLOOD PRESSURE: 100 MMHG

## 2019-04-25 DIAGNOSIS — I10 ESSENTIAL HYPERTENSION: ICD-10-CM

## 2019-04-25 DIAGNOSIS — R73.03 PRE-DIABETES: ICD-10-CM

## 2019-04-25 DIAGNOSIS — E55.9 VITAMIN D DEFICIENCY: ICD-10-CM

## 2019-04-25 DIAGNOSIS — E78.5 DYSLIPIDEMIA: Chronic | ICD-10-CM

## 2019-04-25 DIAGNOSIS — R19.8 DIFFICULTY SWALLOWING PILLS: ICD-10-CM

## 2019-04-25 DIAGNOSIS — Z76.89 ENCOUNTER FOR WEIGHT MANAGEMENT: ICD-10-CM

## 2019-04-25 DIAGNOSIS — R63.5 WEIGHT GAIN: ICD-10-CM

## 2019-04-25 DIAGNOSIS — E04.2 MULTIPLE THYROID NODULES: ICD-10-CM

## 2019-04-25 DIAGNOSIS — E79.0 ELEVATED URIC ACID IN BLOOD: ICD-10-CM

## 2019-04-25 DIAGNOSIS — Z83.49 FAMILY HISTORY OF HEMOCHROMATOSIS: ICD-10-CM

## 2019-04-25 PROCEDURE — 99214 OFFICE O/P EST MOD 30 MIN: CPT | Performed by: NURSE PRACTITIONER

## 2019-04-25 RX ORDER — CELECOXIB 100 MG/1
1 CAPSULE ORAL DAILY
Refills: 0 | COMMUNITY
Start: 2019-04-01 | End: 2020-07-09

## 2019-04-25 RX ORDER — PHENTERMINE HYDROCHLORIDE 30 MG/1
30 CAPSULE ORAL EVERY MORNING
Qty: 30 CAP | Refills: 0 | Status: SHIPPED | OUTPATIENT
Start: 2019-04-25 | End: 2019-05-25

## 2019-04-25 NOTE — PROGRESS NOTES
cc:  Weight loss/difficulty swallowing pills      Subjective:     HPI:     Shana Dunn is a 53 y.o. female here to discuss the evaluation and management of:    Weight gain  Patient is interested in trialing a medication for weight loss.  States that she has tried Contrave in the past but did not feel like it was very successful.  States she is also tried phentermine in the past and does not remember if she was successful not or not.  Denies any heart problems, palpitations, heart racing at this time.  She is interested in starting phentermine again.  At this time she does not want any weight loss surgery.    Difficulty swallowing pills  Patient states that she feels as if she is having a thyroid problem as she has been noticing over the last few months she has had difficulty swallowing her pills.  She states that she is not to had her thyroid nodules ultrasounded in quite some time.  There are no records on file at this time.      Blood pressure  Patient states that she did take her lisinopril for this and HCTZ.  Denies any chest pain, shortness of breath or dizziness.    Family history of hemochromatosis  Patient states that her mother does have hematoma Christeson she would like to have lab work for this.    Dyslipidemia  Due for labs.  Patient does have history of having elevated cholesterol.    ROS:  Denies any Headache, Blurred Vision, Confusion, Chest pain,  Shortness of breath,  Abdominal pain, Changes of bowel or bladder, Lower ext edema, Fevers, Nights sweats, Weight Changes, Focal weakness or numbness.  And all other systems are negative.        Current Outpatient Prescriptions:   •  celecoxib (CELEBREX) 100 MG Cap, Take 1 Cap by mouth every day., Disp: , Rfl: 0  •  phentermine 30 MG capsule, Take 1 Cap by mouth every morning for 30 days., Disp: 30 Cap, Rfl: 0  •  meclizine (ANTIVERT) 25 MG Tab, Take 0.5-1 Tabs by mouth 3 times a day as needed for Dizziness or Vertigo., Disp: 30 Tab, Rfl: 2  •   lisinopril (PRINIVIL) 5 MG Tab, Take 1 Tab by mouth every day., Disp: 30 Tab, Rfl: 6  •  hydroCHLOROthiazide (HYDRODIURIL) 25 MG Tab, Take 1 Tab by mouth every day., Disp: 30 Tab, Rfl: 6  •  citalopram (CELEXA) 20 MG Tab, Take 1 Tab by mouth every day., Disp: 30 Tab, Rfl: 6  •  metronidazole (METROGEL) 0.75 % gel, Apply 1 g to affected area(s) 2 times a day., Disp: 1 Tube, Rfl: 3  •  valACYclovir (VALTREX) 500 MG Tab, Take 1 Tab by mouth 2 times a day., Disp: 20 Tab, Rfl: 1  •  loratadine (CLARITIN) 10 MG Tab, Take 1 Tab by mouth every day., Disp: 30 Tab, Rfl:   •  clobetasol (TEMOVATE) 0.05 % Cream, Apply 1 g to affected area(s) 2 Times a Day., Disp: , Rfl:   •  lansoprazole (PREVACID) 30 MG CAPSULE DELAYED RELEASE, Take 30 mg by mouth every day., Disp: , Rfl:   •  Cholecalciferol (VITAMIN D3) 5000 UNITS Cap, Take 1 Cap by mouth every day., Disp: , Rfl:   •  BIOTIN PO, Take 1,000 Units by mouth every day., Disp: , Rfl:     Allergies   Allergen Reactions   • Penicillins Anaphylaxis   • Zithromax [Azithromycin] Hives and Swelling     Neck and tongue   • Latex Rash   • Crestor  [Rosuvastatin Calcium] Rash   • Crestor [Rosuvastatin Calcium] Hives   • Sulfa Drugs Anaphylaxis   • Wheat Bran      Gastric distress and skin in mouth sloughs off       Past Medical History:   Diagnosis Date   • Arthritis     bilateral knee   • Complex tear of medial meniscus of left knee as current injury 3/6/2017   • Heart burn     chronic   • High cholesterol    • Hypertension    • Multiple thyroid nodules    • Pain 2/23/17    right knee   • Pneumonia 1975   • Psychiatric problem     anxiety     Past Surgical History:   Procedure Laterality Date   • KNEE ARTHROSCOPY Right 3/6/2017    Procedure: KNEE ARTHROSCOPY;  Surgeon: Mati Wagoner M.D.;  Location: [facility];  Service:    • MEDIAL MENISCECTOMY Right 3/6/2017    Procedure: MEDIAL MENISCECTOMY - PARTIAL;  Surgeon: Mati Wagoner M.D.;  Location: [facility];  " Service:    • KNEE ARTHROSCOPY Left 5/18/16   • COLONOSCOPY  2015   • HYSTERECTOMY LAPAROSCOPY  02/2014    ovaries spared   • CAITY BY LAPAROSCOPY  2001   • TUBAL LIGATION Bilateral 1999   • [id number] Bilateral 1982     Family History   Problem Relation Age of Onset   • Heart Failure Mother    • Cancer Father         prostate   • Other Sister         MS   • Cancer Maternal Grandmother         Leukemia     Social History     Social History   • Marital status:      Spouse name: N/A   • Number of children: N/A   • Years of education: N/A     Occupational History   • Not on file.     Social History Main Topics   • Smoking status: Former Smoker     Packs/day: 0.50     Years: 20.00     Types: Cigarettes     Quit date: 1/30/2008   • Smokeless tobacco: Never Used   • Alcohol use Yes      Comment: 2 glasses per week   • Drug use: No   • Sexual activity: Yes     Partners: Male      Comment:      Other Topics Concern   • Not on file     Social History Narrative   • No narrative on file       Objective:     Vitals: /78   Pulse 79   Temp 36.8 °C (98.2 °F)   Resp 16   Ht 1.753 m (5' 9\")   Wt 115.7 kg (255 lb)   SpO2 95%   BMI 37.66 kg/m²    General: Alert, pleasant, NAD  HEENT: Normocephalic.  Neck supple.  No thyromegaly or masses palpated. No cervical or supraclavicular lymphadenopathy.  Skin: Warm, dry, no rashes.  Extremities: No leg edema. No discoloration  Neurological: No tremors  Psych:  Affect/mood is normal, judgement is good, memory is intact, grooming is appropriate.    Assessment/Plan:     Shana was seen today for thyroid problem and weight gain.    Diagnoses and all orders for this visit:    Encounter for weight management  Have discussed with patient that we will follow-up monthly for this.  We have had a long discussion regarding opportunities for her to work on reduction of excessive calories during her diet.  We have discussed opportunities for her to have increased physical " activity.  We have discussed supplementing a protein meal replacement drink.  We have discussed portion control and reducing foods that are high in fructose, concentrated sweets, packaged processed foods.  We will try phentermine for 1 month and she will follow back up.  Have reviewed medication side effects medications.  -     phentermine 30 MG capsule; Take 1 Cap by mouth every morning for 30 days.    Weight gain  -     TSH WITH REFLEX TO FT4; Future  -     TRIIDOTHYRONINE; Future    Multiple thyroid nodules  -     US-SOFT TISSUES OF HEAD - NECK; Future  -     TSH WITH REFLEX TO FT4; Future  -     TRIIDOTHYRONINE; Future    Difficulty swallowing pills  -     US-SOFT TISSUES OF HEAD - NECK; Future    Essential hypertension  Stable.  Denies any chest pain, shortness of breath or dizziness.  Taking lisinopril 5 mg and HCTZ daily for this.  -     Comp Metabolic Panel; Future  -     MICROALBUMIN CREAT RATIO URINE; Future    Dyslipidemia  Follow-up labs  -     Lipid Profile; Future    Pre-diabetes  Follow-up labs  -     HEMOGLOBIN A1C; Future    Family history of hemochromatosis  -     CBC WITHOUT DIFFERENTIAL; Future  -     FERRITIN; Future  -     IRON/TOTAL IRON BIND; Future    Vitamin D deficiency  Follow-up labs  -     VITAMIN D,25 HYDROXY; Future    Elevated uric acid in blood  Follow-up labs  -     URIC ACID, SERUM      No Follow-up on file.        Radha PHIPPS.

## 2019-05-02 ENCOUNTER — HOSPITAL ENCOUNTER (OUTPATIENT)
Dept: RADIOLOGY | Facility: MEDICAL CENTER | Age: 54
End: 2019-05-02
Attending: NURSE PRACTITIONER
Payer: COMMERCIAL

## 2019-05-17 ENCOUNTER — HOSPITAL ENCOUNTER (OUTPATIENT)
Dept: LAB | Facility: MEDICAL CENTER | Age: 54
End: 2019-05-17
Attending: NURSE PRACTITIONER
Payer: COMMERCIAL

## 2019-05-17 DIAGNOSIS — E04.2 MULTIPLE THYROID NODULES: ICD-10-CM

## 2019-05-17 DIAGNOSIS — E78.5 DYSLIPIDEMIA: Chronic | ICD-10-CM

## 2019-05-17 DIAGNOSIS — E55.9 VITAMIN D DEFICIENCY: ICD-10-CM

## 2019-05-17 DIAGNOSIS — R73.03 PRE-DIABETES: ICD-10-CM

## 2019-05-17 DIAGNOSIS — R63.5 WEIGHT GAIN: ICD-10-CM

## 2019-05-17 DIAGNOSIS — I10 ESSENTIAL HYPERTENSION: ICD-10-CM

## 2019-05-17 DIAGNOSIS — Z83.49 FAMILY HISTORY OF HEMOCHROMATOSIS: ICD-10-CM

## 2019-05-17 LAB
25(OH)D3 SERPL-MCNC: 62 NG/ML (ref 30–100)
ALBUMIN SERPL BCP-MCNC: 4.7 G/DL (ref 3.2–4.9)
ALBUMIN/GLOB SERPL: 1.7 G/DL
ALP SERPL-CCNC: 63 U/L (ref 30–99)
ALT SERPL-CCNC: 29 U/L (ref 2–50)
ANION GAP SERPL CALC-SCNC: 10 MMOL/L (ref 0–11.9)
AST SERPL-CCNC: 25 U/L (ref 12–45)
BILIRUB SERPL-MCNC: 0.9 MG/DL (ref 0.1–1.5)
BUN SERPL-MCNC: 17 MG/DL (ref 8–22)
CALCIUM SERPL-MCNC: 9.6 MG/DL (ref 8.5–10.5)
CHLORIDE SERPL-SCNC: 102 MMOL/L (ref 96–112)
CHOLEST SERPL-MCNC: 233 MG/DL (ref 100–199)
CO2 SERPL-SCNC: 27 MMOL/L (ref 20–33)
CREAT SERPL-MCNC: 0.81 MG/DL (ref 0.5–1.4)
CREAT UR-MCNC: 212.6 MG/DL
ERYTHROCYTE [DISTWIDTH] IN BLOOD BY AUTOMATED COUNT: 45.9 FL (ref 35.9–50)
FASTING STATUS PATIENT QL REPORTED: NORMAL
FERRITIN SERPL-MCNC: 259.7 NG/ML (ref 10–291)
GLOBULIN SER CALC-MCNC: 2.7 G/DL (ref 1.9–3.5)
GLUCOSE SERPL-MCNC: 98 MG/DL (ref 65–99)
HCT VFR BLD AUTO: 46.2 % (ref 37–47)
HDLC SERPL-MCNC: 40 MG/DL
HGB BLD-MCNC: 15 G/DL (ref 12–16)
IRON SATN MFR SERPL: 23 % (ref 15–55)
IRON SERPL-MCNC: 71 UG/DL (ref 40–170)
LDLC SERPL CALC-MCNC: 154 MG/DL
MCH RBC QN AUTO: 29.4 PG (ref 27–33)
MCHC RBC AUTO-ENTMCNC: 32.5 G/DL (ref 33.6–35)
MCV RBC AUTO: 90.4 FL (ref 81.4–97.8)
MICROALBUMIN UR-MCNC: 1.8 MG/DL
MICROALBUMIN/CREAT UR: 8 MG/G (ref 0–30)
PLATELET # BLD AUTO: 303 K/UL (ref 164–446)
PMV BLD AUTO: 11.6 FL (ref 9–12.9)
POTASSIUM SERPL-SCNC: 3.7 MMOL/L (ref 3.6–5.5)
PROT SERPL-MCNC: 7.4 G/DL (ref 6–8.2)
RBC # BLD AUTO: 5.11 M/UL (ref 4.2–5.4)
SODIUM SERPL-SCNC: 139 MMOL/L (ref 135–145)
T3 SERPL-MCNC: 110.1 NG/DL (ref 60–181)
TIBC SERPL-MCNC: 308 UG/DL (ref 250–450)
TRIGL SERPL-MCNC: 193 MG/DL (ref 0–149)
TSH SERPL DL<=0.005 MIU/L-ACNC: 1.17 UIU/ML (ref 0.38–5.33)
WBC # BLD AUTO: 7.6 K/UL (ref 4.8–10.8)

## 2019-05-17 PROCEDURE — 80053 COMPREHEN METABOLIC PANEL: CPT

## 2019-05-17 PROCEDURE — 85027 COMPLETE CBC AUTOMATED: CPT

## 2019-05-17 PROCEDURE — 83550 IRON BINDING TEST: CPT

## 2019-05-17 PROCEDURE — 83540 ASSAY OF IRON: CPT

## 2019-05-17 PROCEDURE — 36415 COLL VENOUS BLD VENIPUNCTURE: CPT

## 2019-05-17 PROCEDURE — 84480 ASSAY TRIIODOTHYRONINE (T3): CPT

## 2019-05-17 PROCEDURE — 84443 ASSAY THYROID STIM HORMONE: CPT

## 2019-05-17 PROCEDURE — 82306 VITAMIN D 25 HYDROXY: CPT

## 2019-05-17 PROCEDURE — 80061 LIPID PANEL: CPT

## 2019-05-17 PROCEDURE — 82570 ASSAY OF URINE CREATININE: CPT

## 2019-05-17 PROCEDURE — 82728 ASSAY OF FERRITIN: CPT

## 2019-05-17 PROCEDURE — 82043 UR ALBUMIN QUANTITATIVE: CPT

## 2019-05-17 PROCEDURE — 83036 HEMOGLOBIN GLYCOSYLATED A1C: CPT

## 2019-05-18 LAB
EST. AVERAGE GLUCOSE BLD GHB EST-MCNC: 128 MG/DL
HBA1C MFR BLD: 6.1 % (ref 0–5.6)

## 2019-05-21 RX ORDER — CITALOPRAM 20 MG/1
TABLET ORAL
Qty: 30 TAB | Refills: 6 | Status: SHIPPED | OUTPATIENT
Start: 2019-05-21 | End: 2019-11-14 | Stop reason: SDUPTHER

## 2019-05-28 ENCOUNTER — OFFICE VISIT (OUTPATIENT)
Dept: MEDICAL GROUP | Facility: MEDICAL CENTER | Age: 54
End: 2019-05-28
Payer: COMMERCIAL

## 2019-05-28 VITALS
DIASTOLIC BLOOD PRESSURE: 70 MMHG | HEIGHT: 69 IN | RESPIRATION RATE: 16 BRPM | SYSTOLIC BLOOD PRESSURE: 102 MMHG | HEART RATE: 79 BPM | WEIGHT: 248 LBS | BODY MASS INDEX: 36.73 KG/M2 | OXYGEN SATURATION: 93 % | TEMPERATURE: 97.2 F

## 2019-05-28 DIAGNOSIS — Z71.3 ENCOUNTER FOR WEIGHT LOSS COUNSELING: ICD-10-CM

## 2019-05-28 DIAGNOSIS — R19.7 DIARRHEA, UNSPECIFIED TYPE: ICD-10-CM

## 2019-05-28 DIAGNOSIS — Z12.39 SCREENING FOR MALIGNANT NEOPLASM OF BREAST: ICD-10-CM

## 2019-05-28 DIAGNOSIS — E66.9 CLASS 2 OBESITY WITHOUT SERIOUS COMORBIDITY WITH BODY MASS INDEX (BMI) OF 36.0 TO 36.9 IN ADULT, UNSPECIFIED OBESITY TYPE: ICD-10-CM

## 2019-05-28 DIAGNOSIS — R73.03 PRE-DIABETES: Chronic | ICD-10-CM

## 2019-05-28 PROBLEM — M17.9 OSTEOARTHRITIS OF KNEE: Chronic | Status: ACTIVE | Noted: 2019-05-28

## 2019-05-28 PROBLEM — F41.1 GAD (GENERALIZED ANXIETY DISORDER): Chronic | Status: ACTIVE | Noted: 2018-07-20

## 2019-05-28 PROBLEM — F41.1 GAD (GENERALIZED ANXIETY DISORDER): Status: ACTIVE | Noted: 2018-07-20

## 2019-05-28 PROBLEM — M17.9 OSTEOARTHRITIS OF KNEE: Status: ACTIVE | Noted: 2019-05-28

## 2019-05-28 PROCEDURE — 99214 OFFICE O/P EST MOD 30 MIN: CPT | Performed by: NURSE PRACTITIONER

## 2019-05-28 RX ORDER — PHENTERMINE HYDROCHLORIDE 37.5 MG/1
37.5 CAPSULE ORAL EVERY MORNING
Qty: 30 CAP | Refills: 0 | Status: SHIPPED | OUTPATIENT
Start: 2019-05-28 | End: 2019-06-27

## 2019-05-28 NOTE — PROGRESS NOTES
cc: Follow-up weight gain, prescription refill      Subjective:     HPI:     Shana Dnun is a 53 y.o. female here to discuss the evaluation and management of:      1. Encounter for weight loss counseling  2. Class 2 obesity without serious comorbidity with body mass index (BMI) of 36.0 to 36.9 in adult, unspecified obesity type  Patient is here to follow-up on weight management.  She has tolerated the phentermine without any side effects.    Patient states she is starting to do weight watchers.  She denies any heart racing, nausea or vomiting.  Needs a refill on her prescription.    3. Pre-diabetes  Most recent A1c was 6.1%.    4. Diarrhea, unspecified type  Patient thinks she might be allergic to eggs.  She would like to have an allergy test for this.  States that she has diarrhea intermittently.  No fever, no chills, no blood, no mucus.  Not having more than 2-3 stools a day.    5. Screening for malignant neoplasm of breast  Due for mammo-      ROS:  Denies any Headache, Blurred Vision, Confusion, Chest pain,  Shortness of breath,  Abdominal pain, Changes of bowel or bladder, Lower ext edema, Fevers, Nights sweats, Weight Changes, Focal weakness or numbness.  And all other systems are negative.        Current Outpatient Prescriptions:   •  phentermine 37.5 MG capsule, Take 1 Cap by mouth every morning for 30 days., Disp: 30 Cap, Rfl: 0  •  citalopram (CELEXA) 20 MG Tab, TAKE 1 TABLET BY MOUTH EVERY DAY, Disp: 30 Tab, Rfl: 6  •  celecoxib (CELEBREX) 100 MG Cap, Take 1 Cap by mouth every day., Disp: , Rfl: 0  •  meclizine (ANTIVERT) 25 MG Tab, Take 0.5-1 Tabs by mouth 3 times a day as needed for Dizziness or Vertigo., Disp: 30 Tab, Rfl: 2  •  lisinopril (PRINIVIL) 5 MG Tab, Take 1 Tab by mouth every day., Disp: 30 Tab, Rfl: 6  •  hydroCHLOROthiazide (HYDRODIURIL) 25 MG Tab, Take 1 Tab by mouth every day., Disp: 30 Tab, Rfl: 6  •  metronidazole (METROGEL) 0.75 % gel, Apply 1 g to affected area(s) 2 times a day.,  Disp: 1 Tube, Rfl: 3  •  valACYclovir (VALTREX) 500 MG Tab, Take 1 Tab by mouth 2 times a day., Disp: 20 Tab, Rfl: 1  •  loratadine (CLARITIN) 10 MG Tab, Take 1 Tab by mouth every day., Disp: 30 Tab, Rfl:   •  clobetasol (TEMOVATE) 0.05 % Cream, Apply 1 g to affected area(s) 2 Times a Day., Disp: , Rfl:   •  lansoprazole (PREVACID) 30 MG CAPSULE DELAYED RELEASE, Take 30 mg by mouth every day., Disp: , Rfl:   •  Cholecalciferol (VITAMIN D3) 5000 UNITS Cap, Take 1 Cap by mouth every day., Disp: , Rfl:   •  BIOTIN PO, Take 1,000 Units by mouth every day., Disp: , Rfl:     Allergies   Allergen Reactions   • Penicillins Anaphylaxis   • Zithromax [Azithromycin] Hives and Swelling     Neck and tongue   • Latex Rash   • Crestor  [Rosuvastatin Calcium] Rash   • Crestor [Rosuvastatin Calcium] Hives   • Sulfa Drugs Anaphylaxis   • Wheat Bran      Gastric distress and skin in mouth sloughs off       Past Medical History:   Diagnosis Date   • Arthritis     bilateral knee   • Complex tear of medial meniscus of left knee as current injury 3/6/2017   • Heart burn     chronic   • High cholesterol    • Hypertension    • Multiple thyroid nodules    • Pain 2/23/17    right knee   • Pneumonia 1975   • Psychiatric problem     anxiety     Past Surgical History:   Procedure Laterality Date   • KNEE ARTHROSCOPY Right 3/6/2017    Procedure: KNEE ARTHROSCOPY;  Surgeon: Mati Wagoner M.D.;  Location: [facility];  Service:    • MEDIAL MENISCECTOMY Right 3/6/2017    Procedure: MEDIAL MENISCECTOMY - PARTIAL;  Surgeon: Mati Wagoner M.D.;  Location: [facility];  Service:    • KNEE ARTHROSCOPY Left 5/18/16   • COLONOSCOPY  2015   • HYSTERECTOMY LAPAROSCOPY  02/2014    ovaries spared   • CAITY BY LAPAROSCOPY  2001   • TUBAL LIGATION Bilateral 1999   • [id number] Bilateral 1982     Family History   Problem Relation Age of Onset   • Heart Failure Mother    • Cancer Father         prostate   • Other Sister         MS   •  "Cancer Maternal Grandmother         Leukemia     Social History     Social History   • Marital status:      Spouse name: N/A   • Number of children: N/A   • Years of education: N/A     Occupational History   • Not on file.     Social History Main Topics   • Smoking status: Former Smoker     Packs/day: 0.50     Years: 20.00     Types: Cigarettes     Quit date: 1/30/2008   • Smokeless tobacco: Never Used   • Alcohol use Yes      Comment: 2 glasses per week   • Drug use: No   • Sexual activity: Yes     Partners: Male      Comment:      Other Topics Concern   • Not on file     Social History Narrative   • No narrative on file       Objective:     Vitals: /70   Pulse 79   Temp 36.2 °C (97.2 °F)   Resp 16   Ht 1.753 m (5' 9\")   Wt 112.5 kg (248 lb)   SpO2 93%   BMI 36.62 kg/m²    General: Alert, pleasant, NAD  HEENT: Normocephalic.    Skin: Warm, dry, no rashes.  Extremities: No leg edema. No discoloration  Neurological: No tremors  Psych:  Affect/mood is normal, judgement is good, memory is intact, grooming is appropriate.    Assessment/Plan:     Diagnoses and all orders for this visit:    Encounter for weight loss counseling  Class 2 obesity without serious comorbidity with body mass index (BMI) of 36.0 to 36.9 in adult, unspecified obesity type  Continue phentermine 37.5 mg for 1 more month.  Follow back up afterwards.  Continue to work on dietary modifications, lifestyle changes, heart healthy diet.  Increase physical activity.  -     phentermine 37.5 MG capsule; Take 1 Cap by mouth every morning for 30 days.    Pre-diabetes  Most recent A1c was 6.1%.  Patient is working on weight loss and dietary changes.    Diarrhea, unspecified type  Have discussed working on keeping a diary of her symptoms and when it happens.  Have discussed most common triggers such as egg, milk and wheat.  Noninfectious in nature, no fever or chills or blood or mucus.  Follow-up if symptoms persist or worsen.  -     " ALLERGEN, EGG WHOLE; Future    Screening for malignant neoplasm of breast  -     MA-SCREENING MAMMO BILAT W/TOMOSYNTHESIS W/CAD; Future      Return in about 4 weeks (around 6/25/2019).          Radha PHIPPS.

## 2019-05-28 NOTE — LETTER
[facility]  SONIA DavidP.RMarkN.  [address]  [place] [address]  Fax: [phone]   Authorization for Release/Disclosure of   Protected Health Information   Name: VIDHI LANG : 1965 SSN: [id number]   Address: [address] [place] [address] Phone:    [phone] (home)    I authorize the entity listed below to release/disclose the PHI below to:   [facility]/Radha Dacosta A.P.R.VIRGILIO and SONIA DavidP.RLISANDRO   Provider or Entity Name:  Dr. Davis   [facility], [facility], [facility]   Phone:      Fax:     Reason for request: continuity of care   Information to be released:    [  ] LAST COLONOSCOPY,  including any PATH REPORT and follow-up  [  ] LAST FIT/COLOGUARD RESULT [  ] LAST DEXA  [  ] LAST MAMMOGRAM  [x  ] LAST PAP  [  ] LAST LABS [  ] RETINA EXAM REPORT  [  ] IMMUNIZATION RECORDS  [  ] Release all info      [  ] Check here and initial the line next to each item to release ALL health information INCLUDING  _____ Care and treatment for drug and / or alcohol abuse  _____ HIV testing, infection status, or AIDS  _____ Genetic Testing    DATES OF SERVICE OR TIME PERIOD TO BE DISCLOSED: _____________  I understand and acknowledge that:  * This Authorization may be revoked at any time by you in writing, except if your health information has already been used or disclosed.  * Your health information that will be used or disclosed as a result of you signing this authorization could be re-disclosed by the recipient. If this occurs, your re-disclosed health information may no longer be protected by State or Federal laws.  * You may refuse to sign this Authorization. Your refusal will not affect your ability to obtain treatment.  * This Authorization becomes effective upon signing and will  on (date) __________.      If no date is indicated, this Authorization will  one (1) year from the signature date.    Name: Vidhi Lang    Signature:   Date:       5/28/2019       PLEASE FAX REQUESTED RECORDS BACK TO: [phone]

## 2019-06-05 ENCOUNTER — HOSPITAL ENCOUNTER (OUTPATIENT)
Dept: RADIOLOGY | Facility: MEDICAL CENTER | Age: 54
End: 2019-06-05
Attending: NURSE PRACTITIONER
Payer: COMMERCIAL

## 2019-06-05 DIAGNOSIS — R19.8 DIFFICULTY SWALLOWING PILLS: ICD-10-CM

## 2019-06-05 DIAGNOSIS — E04.2 MULTIPLE THYROID NODULES: ICD-10-CM

## 2019-06-05 PROCEDURE — 76536 US EXAM OF HEAD AND NECK: CPT

## 2019-06-19 ENCOUNTER — HOSPITAL ENCOUNTER (OUTPATIENT)
Dept: RADIOLOGY | Facility: MEDICAL CENTER | Age: 54
End: 2019-06-19
Attending: NURSE PRACTITIONER
Payer: COMMERCIAL

## 2019-06-19 DIAGNOSIS — Z12.39 SCREENING FOR MALIGNANT NEOPLASM OF BREAST: ICD-10-CM

## 2019-06-19 PROCEDURE — 77063 BREAST TOMOSYNTHESIS BI: CPT

## 2020-02-07 ENCOUNTER — OFFICE VISIT (OUTPATIENT)
Dept: MEDICAL GROUP | Facility: MEDICAL CENTER | Age: 55
End: 2020-02-07
Payer: COMMERCIAL

## 2020-02-07 VITALS
DIASTOLIC BLOOD PRESSURE: 70 MMHG | HEIGHT: 69 IN | RESPIRATION RATE: 16 BRPM | HEART RATE: 74 BPM | TEMPERATURE: 98.8 F | BODY MASS INDEX: 37.47 KG/M2 | WEIGHT: 253 LBS | SYSTOLIC BLOOD PRESSURE: 120 MMHG | OXYGEN SATURATION: 94 %

## 2020-02-07 DIAGNOSIS — K12.1 MOUTH ULCERS: ICD-10-CM

## 2020-02-07 DIAGNOSIS — F41.0 PANIC ATTACKS: ICD-10-CM

## 2020-02-07 DIAGNOSIS — R19.4 BOWEL HABIT CHANGES: ICD-10-CM

## 2020-02-07 DIAGNOSIS — R68.2 DRY MOUTH: ICD-10-CM

## 2020-02-07 DIAGNOSIS — R21 RASH: ICD-10-CM

## 2020-02-07 PROCEDURE — 99214 OFFICE O/P EST MOD 30 MIN: CPT | Performed by: NURSE PRACTITIONER

## 2020-02-07 RX ORDER — ALPRAZOLAM 0.25 MG/1
0.5 TABLET ORAL PRN
Qty: 30 TAB | Refills: 1 | Status: SHIPPED | OUTPATIENT
Start: 2020-02-07 | End: 2020-03-08

## 2020-02-07 ASSESSMENT — PATIENT HEALTH QUESTIONNAIRE - PHQ9: CLINICAL INTERPRETATION OF PHQ2 SCORE: 0

## 2020-02-07 NOTE — PROGRESS NOTES
cc:  Change in bowel habits/mouth ulcer      Subjective:     HPI:     Shana Dunn is a 54 y.o. female here to discuss the evaluation and management of:    1. Bowel habit changes  States has been having changes in her bowel that has been worse in the last 3 months. Symptoms include bloating, mucous,  greasy stool and excessive flatulence. Denies pain or blood in her stool. She does know that eggs bother her. No other food triggers that she is aware of. No pain in her abdomen.  Had colonoscopy recently with polyps.     2. Dry mouth  Complains of dry mouth. Has been present for years.     3. Mouth ulcers  Has had mouth ulcers for quite sometime. Worse in the last 6 months.  Recently went to dentist and was informed she should get checked for autoimmune disorder.   She has a spot on her left lower molar that is painful. No area of erythema, +white tissue.  Not apthaous ulcer. No tongue swelling or throat swelling. No food triggers. Did not complete allergen lab.    4. Rash  Has rash on her back. Small red bumps, no blisters. Very itching. No new soaps or detergents. Has been about 6 months on her back, left breast , foot and stomach.     5. Panic attacks  Needs refill on xanax. Last fill per Narx check was 11/2018.    Had colonoscopy recently. Had polyps. [facility]       ROS:  Denies any Headache, Blurred Vision, Confusion, Chest pain,  Shortness of breath,  Abdominal pain, Changes of bowel or bladder, Lower ext edema, Fevers, Nights sweats, Weight Changes, Focal weakness or numbness.  And all other systems reviewed and are all negative        Current Outpatient Medications:   •  ALPRAZolam (XANAX) 0.25 MG Tab, Take 2 Tabs by mouth as needed for up to 30 days., Disp: 30 Tab, Rfl: 1  •  citalopram (CELEXA) 20 MG Tab, TAKE 1 TABLET BY MOUTH EVERY DAY, Disp: 90 Tab, Rfl: 2  •  hydroCHLOROthiazide (HYDRODIURIL) 25 MG Tab, TAKE 1 TABLET BY MOUTH EVERY DAY, Disp: 90 Tab, Rfl: 2  •  lisinopril  (PRINIVIL) 5 MG Tab, TAKE 1 TABLET BY MOUTH EVERY DAY, Disp: 90 Tab, Rfl: 2  •  celecoxib (CELEBREX) 100 MG Cap, Take 1 Cap by mouth every day., Disp: , Rfl: 0  •  meclizine (ANTIVERT) 25 MG Tab, Take 0.5-1 Tabs by mouth 3 times a day as needed for Dizziness or Vertigo., Disp: 30 Tab, Rfl: 2  •  metronidazole (METROGEL) 0.75 % gel, Apply 1 g to affected area(s) 2 times a day., Disp: 1 Tube, Rfl: 3  •  valACYclovir (VALTREX) 500 MG Tab, Take 1 Tab by mouth 2 times a day., Disp: 20 Tab, Rfl: 1  •  loratadine (CLARITIN) 10 MG Tab, Take 1 Tab by mouth every day., Disp: 30 Tab, Rfl:   •  clobetasol (TEMOVATE) 0.05 % Cream, Apply 1 g to affected area(s) 2 Times a Day., Disp: , Rfl:   •  lansoprazole (PREVACID) 30 MG CAPSULE DELAYED RELEASE, Take 30 mg by mouth every day., Disp: , Rfl:   •  Cholecalciferol (VITAMIN D3) 5000 UNITS Cap, Take 1 Cap by mouth every day., Disp: , Rfl:   •  BIOTIN PO, Take 1,000 Units by mouth every day., Disp: , Rfl:     Allergies   Allergen Reactions   • Penicillins Anaphylaxis   • Zithromax [Azithromycin] Hives and Swelling     Neck and tongue   • Latex Rash   • Crestor  [Rosuvastatin Calcium] Rash   • Crestor [Rosuvastatin Calcium] Hives   • Sulfa Drugs Anaphylaxis   • Wheat Bran      Gastric distress and skin in mouth sloughs off       Past Medical History:   Diagnosis Date   • Arthritis     bilateral knee   • Complex tear of medial meniscus of left knee as current injury 3/6/2017   • Heart burn     chronic   • High cholesterol    • Hypertension    • Multiple thyroid nodules    • Pain 2/23/17    right knee   • Pneumonia 1975   • Psychiatric problem     anxiety     Past Surgical History:   Procedure Laterality Date   • KNEE ARTHROSCOPY Right 3/6/2017    Procedure: KNEE ARTHROSCOPY;  Surgeon: Mati Wagoner M.D.;  Location: SURGERY [facility];  Service:    • MEDIAL MENISCECTOMY Right 3/6/2017    Procedure: MEDIAL MENISCECTOMY - PARTIAL;  Surgeon: Mati Wagoner M.D.;  Location: SURGERY  [facility];  Service:    • KNEE ARTHROSCOPY Left 16   • COLONOSCOPY     • HYSTERECTOMY LAPAROSCOPY  2014    ovaries spared   • CAITY BY LAPAROSCOPY     • TUBAL LIGATION Bilateral    • [id number] Bilateral      Family History   Problem Relation Age of Onset   • Heart Failure Mother    • Cancer Father         prostate   • Other Sister         MS   • Cancer Maternal Grandmother         Leukemia     Social History     Socioeconomic History   • Marital status:      Spouse name: Not on file   • Number of children: Not on file   • Years of education: Not on file   • Highest education level: Not on file   Occupational History   • Not on file   Social Needs   • Financial resource strain: Not on file   • Food insecurity:     Worry: Not on file     Inability: Not on file   • Transportation needs:     Medical: Not on file     Non-medical: Not on file   Tobacco Use   • Smoking status: Former Smoker     Packs/day: 0.50     Years: 20.00     Pack years: 10.00     Types: Cigarettes     Last attempt to quit: 2008     Years since quittin.0   • Smokeless tobacco: Never Used   Substance and Sexual Activity   • Alcohol use: Yes     Comment: 2 glasses per week   • Drug use: No   • Sexual activity: Yes     Partners: Male     Comment:    Lifestyle   • Physical activity:     Days per week: Not on file     Minutes per session: Not on file   • Stress: Not on file   Relationships   • Social connections:     Talks on phone: Not on file     Gets together: Not on file     Attends Zoroastrianism service: Not on file     Active member of club or organization: Not on file     Attends meetings of clubs or organizations: Not on file     Relationship status: Not on file   • Intimate partner violence:     Fear of current or ex partner: Not on file     Emotionally abused: Not on file     Physically abused: Not on file     Forced sexual activity: Not on file   Other Topics Concern   • Not on file   Social  "History Narrative   • Not on file       Objective:     Vitals: /70   Pulse 74   Temp 37.1 °C (98.8 °F)   Resp 16   Ht 1.753 m (5' 9\")   Wt 114.8 kg (253 lb)   SpO2 94%   BMI 37.36 kg/m²    General: Alert, pleasant, NAD  HEENT: Normocephalic. Dry mucous membranes. White slough tissue on left lower molar.  Skin: Warm, dry, few red papules on back and one on left breast. No fluid filled vesicles, no patter.   Extremities: No leg edema. No discoloration  Neurological: No tremors  Psych:  Affect/mood is normal, judgement is good, memory is intact, grooming is appropriate.    Assessment/Plan:     Diagnoses and all orders for this visit:    Bowel habit changes  NAD. Patient is stable. No weight or night sweats. Recently had Colonoscopy with polyps. No evidence of inflammatory bowel dx. Keep food diary. Follow up after labs.  -     VICKIE REFLEXIVE PROFILE; Future  -     TSH WITH REFLEX TO FT4; Future  -     ALLERGEN, FOOD INCLUSIVE PANEL; Future    Dry mouth  -     VICKIE REFLEXIVE PROFILE; Future    Mouth ulcers  -     VICKIE REFLEXIVE PROFILE; Future  -     VITAMIN B12; Future    Rash  -     VICKIE REFLEXIVE PROFILE; Future  -     Sed Rate; Future  -     ALLERGEN, FOOD INCLUSIVE PANEL; Future  -     T.PALLIDUM AB EIA    Panic attacks  Controlled. Uses as needed xanax sparingly. Refill provided. Narx check completed.   -     ALPRAZolam (XANAX) 0.25 MG Tab; Take 2 Tabs by mouth as needed for up to 30 days.      No follow-ups on file.          Radha FIELD  "

## 2020-02-07 NOTE — LETTER
[facility]  SONIA DavidPMarkRMarkN.  [address]  [place] [address]  Fax: [phone]   Authorization for Release/Disclosure of   Protected Health Information   Name: VIDHI DUNN : 1965 SSN: [id number]   Address: [address] [place] [address] [place] [address] Phone:    [phone] (home)    I authorize the entity listed below to release/disclose the PHI below to:   [facility]/SHREYA David.P.R.VIRGILIO and EYAD DavidRLISANDRO   Provider or Entity Name:  [facility]   Address   City, State, [facility]   Phone:      Fax:     Reason for request: continuity of care   Information to be released:    [ x ] LAST COLONOSCOPY,  including any PATH REPORT and follow-up  [  ] LAST FIT/COLOGUARD RESULT [  ] LAST DEXA  [  ] LAST MAMMOGRAM  [  ] LAST PAP  [  ] LAST LABS [  ] RETINA EXAM REPORT  [  ] IMMUNIZATION RECORDS  [  ] Release all info      [  ] Check here and initial the line next to each item to release ALL health information INCLUDING  _____ Care and treatment for drug and / or alcohol abuse  _____ HIV testing, infection status, or AIDS  _____ Genetic Testing    DATES OF SERVICE OR TIME PERIOD TO BE DISCLOSED: _____________  I understand and acknowledge that:  * This Authorization may be revoked at any time by you in writing, except if your health information has already been used or disclosed.  * Your health information that will be used or disclosed as a result of you signing this authorization could be re-disclosed by the recipient. If this occurs, your re-disclosed health information may no longer be protected by State or Federal laws.  * You may refuse to sign this Authorization. Your refusal will not affect your ability to obtain treatment.  * This Authorization becomes effective upon signing and will  on (date) __________.      If no date is indicated, this Authorization will  one (1) year from the signature date.    Name: Vidhi Dunn    Signature:   Date:          2/7/2020       PLEASE FAX REQUESTED RECORDS BACK TO: [phone]

## 2020-02-11 ENCOUNTER — HOSPITAL ENCOUNTER (OUTPATIENT)
Dept: LAB | Facility: MEDICAL CENTER | Age: 55
End: 2020-02-11
Attending: NURSE PRACTITIONER
Payer: COMMERCIAL

## 2020-02-11 DIAGNOSIS — K12.1 MOUTH ULCERS: ICD-10-CM

## 2020-02-11 DIAGNOSIS — R21 RASH: ICD-10-CM

## 2020-02-11 DIAGNOSIS — R68.2 DRY MOUTH: ICD-10-CM

## 2020-02-11 DIAGNOSIS — R19.4 BOWEL HABIT CHANGES: ICD-10-CM

## 2020-02-11 LAB — ERYTHROCYTE [SEDIMENTATION RATE] IN BLOOD BY WESTERGREN METHOD: 11 MM/HOUR (ref 0–30)

## 2020-02-11 PROCEDURE — 86003 ALLG SPEC IGE CRUDE XTRC EA: CPT | Mod: 91

## 2020-02-11 PROCEDURE — 84443 ASSAY THYROID STIM HORMONE: CPT

## 2020-02-11 PROCEDURE — 36415 COLL VENOUS BLD VENIPUNCTURE: CPT

## 2020-02-11 PROCEDURE — 85652 RBC SED RATE AUTOMATED: CPT

## 2020-02-11 PROCEDURE — 86038 ANTINUCLEAR ANTIBODIES: CPT

## 2020-02-11 PROCEDURE — 82607 VITAMIN B-12: CPT

## 2020-02-12 LAB
TSH SERPL DL<=0.005 MIU/L-ACNC: 2.12 UIU/ML (ref 0.38–5.33)
VIT B12 SERPL-MCNC: 529 PG/ML (ref 211–911)

## 2020-02-13 LAB — NUCLEAR IGG SER QL IA: NORMAL

## 2020-03-13 LAB
ALMOND IGE QN: <0.1 KU/L
ASPARAGUS IGE QN: <0.1 KU/L
AVOCADO IGE QN: <0.1 KU/L
BAKER'S YEAST IGE QN: <0.1 KU/L
BANANA IGE QN: <0.1 KU/L
BASIL IGE QN: <0.1 KU/L
BAYLEAF IGE QN: <0.1 KU/L
BEEF IGE QN: <0.1 KU/L
BEET IGE QN: <0.1 KU/L
BELL PEPPER IGE QN: <0.1 KU/L
BLACK PEPPER IGE QN: <0.1 KU/L
BLUE MUSSEL IGE QN: <0.1 KU/L
BLUEBERRY IGE QN: <0.1 KU/L
BRAZIL NUT IGE QN: <0.1 KU/L
BROCCOLI IGE QN: <0.1 KU/L
BUCKWHEAT IGE QN: <0.1 KU/L
CABBAGE IGE QN: <0.1 KU/L
CARROT IGE QN: <0.1 KU/L
CASHEW NUT IGE QN: <0.1 KU/L
CHESTNUT IGE QN: <0.1 KU/L
CHICKPEA IGE AB [UNITS/VOLUME] IN SERUM: <0.1 KU/L
CHOCOLATE IGE QN: <0.1 KU/L
CINNAMON IGE QN: <0.1 KU/L
CLAM IGE QN: <0.1 KU/L
COCONUT IGE QN: <0.1 KU/L
CODFISH IGE QN: <0.1 KU/L
COW MILK IGE QN: <0.1 KU/L
CRAB IGE QN: <0.1 KU/L
CUCUMBER IGE QN: <0.1 KU/L
CULTIVATED COTTON IGE QN: <0.1 KU/L
DEPRECATED MISC ALLERGEN IGE RAST QL: NORMAL
DILL IGE QN: <0.1 KU/L
EGG WHITE IGE QN: <0.1 KU/L
GINGER IGE QN: <0.1 KU/L
GRAPE IGE QN: <0.1 KU/L
HALIBUT IGE QN: <0.1 KU/L
HAZELNUT IGE QN: <0.1 KU/L
LETTUCE IGE QN: <0.1 KU/L
LIMA BEAN IGE QN: <0.1 KU/L
MELON IGE QN: <0.1 KU/L
ONION IGE QN: <0.1 KU/L
ORANGE IGE QN: <0.1 KU/L
OREGANO IGE QN: <0.1 KU/L
PEA IGE QN: <0.1 KU/L
PEANUT IGE QN: <0.1 KU/L
PEAR IGE QN: <0.1 KU/L
PLUM IGE QN: <0.1 KU/L
PORK IGE QN: <0.1 KU/L
POTATO IGE QN: <0.1 KU/L
RASPBERRY IGE QN: <0.1 KU/L
SESAME SEED IGE QN: <0.1 KU/L
SHRIMP IGE QN: <0.1 KU/L
SOYBEAN IGE QN: <0.1 KU/L
TEA IGE QN: <0.1 KU/L
TOMATO IGE QN: <0.1 KU/L
TROUT IGE QN: <0.1 KU/L
TURKEY MEAT IGE QN: <0.1 KU/L
WALNUT IGE QN: <0.1 KU/L
WATERMELON IGE QN: <0.1 KU/L

## 2020-03-16 ENCOUNTER — HOSPITAL ENCOUNTER (OUTPATIENT)
Facility: MEDICAL CENTER | Age: 55
End: 2020-03-16
Attending: NURSE PRACTITIONER
Payer: COMMERCIAL

## 2020-03-16 PROCEDURE — 87086 URINE CULTURE/COLONY COUNT: CPT

## 2020-03-19 LAB
BACTERIA UR CULT: NORMAL
SIGNIFICANT IND 70042: NORMAL
SITE SITE: NORMAL
SOURCE SOURCE: NORMAL

## 2020-05-15 RX ORDER — HYDROCHLOROTHIAZIDE 25 MG/1
TABLET ORAL
Qty: 90 TAB | Refills: 2 | Status: SHIPPED | OUTPATIENT
Start: 2020-05-15 | End: 2021-02-04

## 2020-05-15 RX ORDER — LISINOPRIL 5 MG/1
TABLET ORAL
Qty: 90 TAB | Refills: 2 | Status: SHIPPED | OUTPATIENT
Start: 2020-05-15 | End: 2021-02-04

## 2020-07-17 ENCOUNTER — HOSPITAL ENCOUNTER (OUTPATIENT)
Dept: LAB | Facility: MEDICAL CENTER | Age: 55
End: 2020-07-17
Attending: NURSE PRACTITIONER
Payer: COMMERCIAL

## 2020-07-17 DIAGNOSIS — R61 NIGHT SWEATS: ICD-10-CM

## 2020-07-17 DIAGNOSIS — Z83.49 FAMILY HISTORY OF HEMOCHROMATOSIS: ICD-10-CM

## 2020-07-17 DIAGNOSIS — E78.5 DYSLIPIDEMIA: Chronic | ICD-10-CM

## 2020-07-17 DIAGNOSIS — R19.4 BOWEL HABIT CHANGES: ICD-10-CM

## 2020-07-17 DIAGNOSIS — R73.03 PRE-DIABETES: ICD-10-CM

## 2020-07-17 DIAGNOSIS — R82.90 ABNORMAL URINE ODOR: ICD-10-CM

## 2020-07-17 DIAGNOSIS — M25.50 ARTHRALGIA, UNSPECIFIED JOINT: ICD-10-CM

## 2020-07-17 DIAGNOSIS — K12.1 MOUTH ULCERS: ICD-10-CM

## 2020-07-17 LAB
APPEARANCE UR: ABNORMAL
BACTERIA #/AREA URNS HPF: ABNORMAL /HPF
BASOPHILS # BLD AUTO: 0.6 % (ref 0–1.8)
BASOPHILS # BLD: 0.06 K/UL (ref 0–0.12)
BILIRUB UR QL STRIP.AUTO: NEGATIVE
COLOR UR: YELLOW
EOSINOPHIL # BLD AUTO: 0.19 K/UL (ref 0–0.51)
EOSINOPHIL NFR BLD: 1.9 % (ref 0–6.9)
EPI CELLS #/AREA URNS HPF: ABNORMAL /HPF
ERYTHROCYTE [DISTWIDTH] IN BLOOD BY AUTOMATED COUNT: 43 FL (ref 35.9–50)
GLUCOSE UR STRIP.AUTO-MCNC: NEGATIVE MG/DL
HCT VFR BLD AUTO: 43.7 % (ref 37–47)
HGB BLD-MCNC: 15 G/DL (ref 12–16)
IMM GRANULOCYTES # BLD AUTO: 0.05 K/UL (ref 0–0.11)
IMM GRANULOCYTES NFR BLD AUTO: 0.5 % (ref 0–0.9)
KETONES UR STRIP.AUTO-MCNC: ABNORMAL MG/DL
LEUKOCYTE ESTERASE UR QL STRIP.AUTO: ABNORMAL
LYMPHOCYTES # BLD AUTO: 2.94 K/UL (ref 1–4.8)
LYMPHOCYTES NFR BLD: 29.1 % (ref 22–41)
MCH RBC QN AUTO: 30.4 PG (ref 27–33)
MCHC RBC AUTO-ENTMCNC: 34.3 G/DL (ref 33.6–35)
MCV RBC AUTO: 88.6 FL (ref 81.4–97.8)
MICRO URNS: ABNORMAL
MONOCYTES # BLD AUTO: 0.51 K/UL (ref 0–0.85)
MONOCYTES NFR BLD AUTO: 5.1 % (ref 0–13.4)
NEUTROPHILS # BLD AUTO: 6.34 K/UL (ref 2–7.15)
NEUTROPHILS NFR BLD: 62.8 % (ref 44–72)
NITRITE UR QL STRIP.AUTO: NEGATIVE
NRBC # BLD AUTO: 0 K/UL
NRBC BLD-RTO: 0 /100 WBC
PH UR STRIP.AUTO: 6 [PH] (ref 5–8)
PLATELET # BLD AUTO: 289 K/UL (ref 164–446)
PMV BLD AUTO: 11.3 FL (ref 9–12.9)
PROT UR QL STRIP: 30 MG/DL
RBC # BLD AUTO: 4.93 M/UL (ref 4.2–5.4)
RBC # URNS HPF: ABNORMAL /HPF
RBC UR QL AUTO: NEGATIVE
SP GR UR STRIP.AUTO: 1.02
UROBILINOGEN UR STRIP.AUTO-MCNC: 0.2 MG/DL
WBC # BLD AUTO: 10.1 K/UL (ref 4.8–10.8)
WBC #/AREA URNS HPF: ABNORMAL /HPF

## 2020-07-17 PROCEDURE — 80053 COMPREHEN METABOLIC PANEL: CPT

## 2020-07-17 PROCEDURE — 87389 HIV-1 AG W/HIV-1&-2 AB AG IA: CPT

## 2020-07-17 PROCEDURE — 83036 HEMOGLOBIN GLYCOSYLATED A1C: CPT

## 2020-07-17 PROCEDURE — 81001 URINALYSIS AUTO W/SCOPE: CPT

## 2020-07-17 PROCEDURE — 86431 RHEUMATOID FACTOR QUANT: CPT

## 2020-07-17 PROCEDURE — 84443 ASSAY THYROID STIM HORMONE: CPT

## 2020-07-17 PROCEDURE — 82728 ASSAY OF FERRITIN: CPT

## 2020-07-17 PROCEDURE — 86038 ANTINUCLEAR ANTIBODIES: CPT

## 2020-07-17 PROCEDURE — 36415 COLL VENOUS BLD VENIPUNCTURE: CPT

## 2020-07-17 PROCEDURE — 81256 HFE GENE: CPT

## 2020-07-17 PROCEDURE — 86780 TREPONEMA PALLIDUM: CPT

## 2020-07-17 PROCEDURE — 82607 VITAMIN B-12: CPT

## 2020-07-17 PROCEDURE — 85025 COMPLETE CBC W/AUTO DIFF WBC: CPT

## 2020-07-17 PROCEDURE — 82746 ASSAY OF FOLIC ACID SERUM: CPT

## 2020-07-17 PROCEDURE — 82784 ASSAY IGA/IGD/IGG/IGM EACH: CPT

## 2020-07-17 PROCEDURE — 80061 LIPID PANEL: CPT

## 2020-07-18 LAB
ALBUMIN SERPL BCP-MCNC: 4.6 G/DL (ref 3.2–4.9)
ALBUMIN/GLOB SERPL: 1.8 G/DL
ALP SERPL-CCNC: 96 U/L (ref 30–99)
ALT SERPL-CCNC: 37 U/L (ref 2–50)
ANION GAP SERPL CALC-SCNC: 17 MMOL/L (ref 7–16)
AST SERPL-CCNC: 28 U/L (ref 12–45)
BILIRUB SERPL-MCNC: 0.6 MG/DL (ref 0.1–1.5)
BUN SERPL-MCNC: 13 MG/DL (ref 8–22)
CALCIUM SERPL-MCNC: 9.5 MG/DL (ref 8.5–10.5)
CHLORIDE SERPL-SCNC: 99 MMOL/L (ref 96–112)
CHOLEST SERPL-MCNC: 275 MG/DL (ref 100–199)
CO2 SERPL-SCNC: 22 MMOL/L (ref 20–33)
CREAT SERPL-MCNC: 0.65 MG/DL (ref 0.5–1.4)
EST. AVERAGE GLUCOSE BLD GHB EST-MCNC: 128 MG/DL
FASTING STATUS PATIENT QL REPORTED: NORMAL
FERRITIN SERPL-MCNC: 381 NG/ML (ref 10–291)
FOLATE SERPL-MCNC: 2.5 NG/ML
GLOBULIN SER CALC-MCNC: 2.6 G/DL (ref 1.9–3.5)
GLUCOSE SERPL-MCNC: 103 MG/DL (ref 65–99)
HBA1C MFR BLD: 6.1 % (ref 0–5.6)
HDLC SERPL-MCNC: 41 MG/DL
HIV 1+2 AB+HIV1 P24 AG SERPL QL IA: NORMAL
LDLC SERPL CALC-MCNC: 167 MG/DL
POTASSIUM SERPL-SCNC: 3.7 MMOL/L (ref 3.6–5.5)
PROT SERPL-MCNC: 7.2 G/DL (ref 6–8.2)
RHEUMATOID FACT SER IA-ACNC: <10 IU/ML (ref 0–14)
SODIUM SERPL-SCNC: 138 MMOL/L (ref 135–145)
TREPONEMA PALLIDUM IGG+IGM AB [PRESENCE] IN SERUM OR PLASMA BY IMMUNOASSAY: NORMAL
TRIGL SERPL-MCNC: 334 MG/DL (ref 0–149)
TSH SERPL DL<=0.005 MIU/L-ACNC: 1.62 UIU/ML (ref 0.38–5.33)
VIT B12 SERPL-MCNC: 969 PG/ML (ref 211–911)

## 2020-07-20 LAB
IGA SERPL-MCNC: 110 MG/DL (ref 68–408)
NUCLEAR IGG SER QL IA: NORMAL

## 2020-07-27 LAB
HFE GENE MUT ANL BLD/T: NORMAL
HFE P.C282Y BLD/T QL: NORMAL
HFE P.H63D BLD/T QL: NEGATIVE
HFE P.S65C BLD/T QL: NEGATIVE

## 2020-08-05 ENCOUNTER — TELEMEDICINE (OUTPATIENT)
Dept: MEDICAL GROUP | Facility: MEDICAL CENTER | Age: 55
End: 2020-08-05
Payer: COMMERCIAL

## 2020-08-05 VITALS
HEART RATE: 86 BPM | SYSTOLIC BLOOD PRESSURE: 142 MMHG | WEIGHT: 255 LBS | HEIGHT: 69 IN | DIASTOLIC BLOOD PRESSURE: 87 MMHG | BODY MASS INDEX: 37.77 KG/M2

## 2020-08-05 DIAGNOSIS — R79.89 ELEVATED FERRITIN: ICD-10-CM

## 2020-08-05 DIAGNOSIS — E78.5 DYSLIPIDEMIA: Chronic | ICD-10-CM

## 2020-08-05 DIAGNOSIS — M25.50 ARTHRALGIA, UNSPECIFIED JOINT: ICD-10-CM

## 2020-08-05 DIAGNOSIS — E53.8 FOLIC ACID DEFICIENCY: ICD-10-CM

## 2020-08-05 DIAGNOSIS — R73.03 PRE-DIABETES: Chronic | ICD-10-CM

## 2020-08-05 PROCEDURE — 99442 PR PHYSICIAN TELEPHONE EVALUATION 11-20 MIN: CPT | Mod: CR | Performed by: NURSE PRACTITIONER

## 2020-08-05 RX ORDER — LOVASTATIN 10 MG/1
10 TABLET ORAL
Qty: 30 TAB | Status: CANCELLED | OUTPATIENT
Start: 2020-08-05

## 2020-08-05 ASSESSMENT — FIBROSIS 4 INDEX: FIB4 SCORE: 0.88

## 2020-08-05 NOTE — PROGRESS NOTES
As a means of avoiding spread of COVID-19, this visit is being conducted by telephone. This telephone visit was initiated by the patient and they verbally consented.    Time at start of call: 07:18    Reason for Call:  Follow up labs    Shana Brenda Shaun via PHONE VISIT to discuss:  Unable to perform via VIDEO due to unable to access Zoom sercurely      Cholesterol  Have reviewed most recent lipid panel.  Total cholesterol 275, triglycerides 334, HDL 41 and . The 10-year ASCVD risk score (Nadine DC Jr., et al., 2013) is: 4.5%. she had rash and swelling, and joint pain with crestor. Resolved after stopping.     Elevated ferritin  Most recent ferritin levels at 381.0.  Increase from previous year. 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     Folic acid   Suboptimal on recent labs.  Most recent blood gas of 2.5.  H&H 15.0 and 43.7.  MCV 88.6.    Joint pain  Continues to be problematic for the patient.  Negative RF, negative for syphilis, negative HIV, negative VICKIE on multiple occasions.  Normal sed rate.  Does have elevated ferritin.    Pre Diabetes  A1c 6.1 on most recent labs.  Consistent with 1 year ago.          Vitals:    08/05/20 0708   BP: 142/87   Pulse: 86       Patient Active Problem List    Diagnosis Date Noted   • Osteoarthritis of knee 05/28/2019   • Multiple thyroid nodules 04/25/2019   • Family history of hemochromatosis 04/25/2019   • ASHLY (generalized anxiety disorder) 07/20/2018   • Elevated uric acid in blood 07/20/2018   • Dyslipidemia 06/20/2018   • Gastroesophageal reflux disease 06/20/2018   • Arthralgia 06/20/2018   • Pre-diabetes 06/20/2018   • Lichen sclerosus 06/20/2018       Current Outpatient Medications   Medication Sig Dispense Refill   • atorvastatin (LIPITOR) 10 MG Tab Take 1 Tab by mouth every bedtime. 30 Tab 1   • Ibuprofen-Famotidine (DUEXIS) 800-26.6 MG Tab      • celecoxib (CELEBREX) 200 MG Cap Take 200 mg by mouth every day.     • triamcinolone acetonide (KENALOG) 0.1 % Cream APPLY TO  AFFECTED AREA (LEGS, TORSO) TWICE A DAY 2 WEEKS ON AND 2 WEEKS OFF, AS NEEDED FOR FLARES     • lansoprazole (PREVACID) 30 MG CAPSULE DELAYED RELEASE Take 1 Cap by mouth every day. 90 Cap 3   • hydroCHLOROthiazide (HYDRODIURIL) 25 MG Tab TAKE 1 TABLET BY MOUTH EVERY DAY 90 Tab 2   • lisinopril (PRINIVIL) 5 MG Tab TAKE 1 TABLET BY MOUTH EVERY DAY 90 Tab 2   • loratadine (CLARITIN) 10 MG Tab Take 1 Tab by mouth every day. 30 Tab    • clobetasol (TEMOVATE) 0.05 % Cream Apply 1 g to affected area(s) 2 Times a Day.     • Cholecalciferol (VITAMIN D3) 5000 UNITS Cap Take 1 Cap by mouth every day.     • BIOTIN PO Take 1,000 Units by mouth every day.     • citalopram (CELEXA) 20 MG Tab TAKE 1 TABLET BY MOUTH EVERY DAY 90 Tab 2   • ALPRAZolam (XANAX) 0.25 MG Tab TAKE 2 TABLETS BY MOUTH DAILY AS NEEDED FOR UP TO 30 DAYS 60 Tab 2   • metronidazole (METROGEL) 0.75 % gel Apply 1 g to affected area(s) 2 times a day. 1 Tube 3     No current facility-administered medications for this visit.        Assessment and plan:      1. Dyslipidemia  Chronic problem the patient.  Concern for worsening cholesterol levels.  I discussed starting a low dose statin.  She did have some mild side effects with Crestor in the past.  Have reviewed medication indications and side effects again with the patient.  She will trial and follow-up if symptoms occur.  - atorvastatin (LIPITOR) 10 MG Tab; Take 1 Tab by mouth every bedtime.  Dispense: 30 Tab; Refill: 1    2. Elevated ferritin  Increase from previous level. Heterozygous for hereditary hemochromatosis.   Follow-up ultrasound of the liver.   - US-RUQ; Future  - IRON/TOTAL IRON BIND; Future    3. Arthralgia, unspecified joint  Has been chronic for the patient.  Has had negative VICKIE, negative RF factor, normal sed rate.  Negative syphilis, negative HIV.  Possibly related to elevated ferritin levels. ?Rheumatology.  - URIC ACID; Future    4. Pre-diabetes  A1c 6.1%.  Continue to routinely monitor.    5.  Folic acid deficiency  Most recent folic acid 2.5.  No anemia, normocytic.  Recommend folic acid 1 mg daily.      Follow up after ultrasound.  Or as needed    Time at end of call: 07:36    Total Time Spent: 11-20 minutes        Radha TURNER

## 2020-08-06 DIAGNOSIS — F41.0 PANIC ATTACKS: ICD-10-CM

## 2020-08-06 PROBLEM — M25.50 ARTHRALGIA: Chronic | Status: ACTIVE | Noted: 2018-06-20

## 2020-08-06 RX ORDER — CITALOPRAM 20 MG/1
TABLET ORAL
Qty: 90 TAB | Refills: 2 | Status: SHIPPED | OUTPATIENT
Start: 2020-08-06 | End: 2021-05-05

## 2020-08-06 RX ORDER — ALPRAZOLAM 0.25 MG/1
TABLET ORAL
Qty: 60 TAB | Refills: 2 | Status: SHIPPED | OUTPATIENT
Start: 2020-08-06 | End: 2021-05-03

## 2020-08-06 RX ORDER — ATORVASTATIN CALCIUM 10 MG/1
10 TABLET, FILM COATED ORAL
Qty: 30 TAB | Refills: 1 | Status: SHIPPED | OUTPATIENT
Start: 2020-08-06 | End: 2020-08-31

## 2020-08-17 PROBLEM — E53.8 FOLIC ACID DEFICIENCY: Status: ACTIVE | Noted: 2020-08-17

## 2020-08-17 PROBLEM — R79.89 ELEVATED FERRITIN: Status: ACTIVE | Noted: 2020-08-17

## 2020-08-24 ENCOUNTER — HOSPITAL ENCOUNTER (OUTPATIENT)
Dept: RADIOLOGY | Facility: MEDICAL CENTER | Age: 55
End: 2020-08-24
Attending: NURSE PRACTITIONER
Payer: COMMERCIAL

## 2020-08-24 DIAGNOSIS — R79.89 ELEVATED FERRITIN: ICD-10-CM

## 2020-08-24 PROCEDURE — 76705 ECHO EXAM OF ABDOMEN: CPT

## 2020-08-25 PROBLEM — K76.0 HEPATIC STEATOSIS: Status: ACTIVE | Noted: 2020-08-25

## 2020-12-15 ENCOUNTER — HOSPITAL ENCOUNTER (OUTPATIENT)
Dept: RADIOLOGY | Facility: MEDICAL CENTER | Age: 55
End: 2020-12-15
Attending: NURSE PRACTITIONER
Payer: COMMERCIAL

## 2020-12-15 DIAGNOSIS — Z12.31 VISIT FOR SCREENING MAMMOGRAM: ICD-10-CM

## 2020-12-15 PROCEDURE — 77067 SCR MAMMO BI INCL CAD: CPT

## 2021-02-26 RX ORDER — ATORVASTATIN CALCIUM 10 MG/1
TABLET, FILM COATED ORAL
Qty: 90 TABLET | Refills: 2 | Status: SHIPPED | OUTPATIENT
Start: 2021-02-26 | End: 2021-11-10

## 2021-03-09 ENCOUNTER — TELEMEDICINE (OUTPATIENT)
Dept: MEDICAL GROUP | Facility: MEDICAL CENTER | Age: 56
End: 2021-03-09
Payer: COMMERCIAL

## 2021-03-09 VITALS
TEMPERATURE: 97.9 F | HEART RATE: 88 BPM | BODY MASS INDEX: 35.25 KG/M2 | HEIGHT: 69 IN | DIASTOLIC BLOOD PRESSURE: 81 MMHG | SYSTOLIC BLOOD PRESSURE: 128 MMHG | WEIGHT: 238 LBS

## 2021-03-09 DIAGNOSIS — E53.8 FOLIC ACID DEFICIENCY: ICD-10-CM

## 2021-03-09 DIAGNOSIS — R82.90 ABNORMAL URINE ODOR: ICD-10-CM

## 2021-03-09 DIAGNOSIS — N28.1 RENAL CYST: ICD-10-CM

## 2021-03-09 DIAGNOSIS — M25.50 POLYARTHRALGIA: ICD-10-CM

## 2021-03-09 DIAGNOSIS — R79.89 ELEVATED FERRITIN: ICD-10-CM

## 2021-03-09 DIAGNOSIS — Z78.0 POSTMENOPAUSAL: ICD-10-CM

## 2021-03-09 DIAGNOSIS — R73.03 PRE-DIABETES: ICD-10-CM

## 2021-03-09 PROCEDURE — 99213 OFFICE O/P EST LOW 20 MIN: CPT | Mod: 95,CR | Performed by: NURSE PRACTITIONER

## 2021-03-09 ASSESSMENT — FIBROSIS 4 INDEX: FIB4 SCORE: 0.88

## 2021-03-09 ASSESSMENT — PATIENT HEALTH QUESTIONNAIRE - PHQ9: CLINICAL INTERPRETATION OF PHQ2 SCORE: 0

## 2021-03-09 NOTE — PROGRESS NOTES
"[facility] Video Visit: Established Patient   This Remote Face to Face encounter was conducted via Zoom. Given the importance of social distancing and other strategies recommended to reduce the risk of COVID-19 transmission, I am providing medical care to this patient via audio/video visit in place of an in person visit at the request of the patient. Verbal consent to telehealth, risks, benefits, and consequences were discussed. Patient retains the right to withdraw at any time. All existing confidentiality protections apply. The patient has access to all transmitted medical information. No dissemination of any patient images or information to other entities without further written consent.  Subjective:     Chief Complaint   Patient presents with   • Joint Swelling     both sides   • Orders Needed     blood work       Shana Dunn is a 55 y.o. female presenting for evaluation and management of:    Feels as if her joint issues have worsening.   Has has two episodes in the last two months.     Starts with her face gets red and flushed \"just don't feel good.\"   Hands will swell up. Will have joint pain for 24-48 hours that is intense. All of her joints.  Feels jittery and shaky. Feels like she might be having \"an allergic rxn.\"   Took a benadryl and slept-felt better b/c she slept.   Still will have joint pain and swelling for about 24-48 hours then back to normal \"joint pain.\"     Negative RF, Negative CCP negative HLA-B27, negative for syphilis, negative HIV, negative VICKIE on multiple occasions.  Normal sed rate.  Does have elevated ferritin.    Urine is cloudy and with a foul odor-staying hydrated.     Went to Dr. Flores for her back recently had an MRI.     Sub optimal Folic Acid  Forgot to start taking this since last visit.         ROS see above  Denies any recent fevers or chills. No nausea or vomiting. No chest pains or shortness of breath.     Allergies   Allergen Reactions   • Penicillins Anaphylaxis "   • Zithromax [Azithromycin] Hives and Swelling     Neck and tongue   • Latex Rash   • Crestor  [Rosuvastatin Calcium] Rash   • Crestor [Rosuvastatin Calcium] Hives   • Sulfa Drugs Anaphylaxis   • Wheat Bran      Gastric distress and skin in mouth sloughs off       Current medicines (including changes today)  Current Outpatient Medications   Medication Sig Dispense Refill   • atorvastatin (LIPITOR) 10 MG Tab TAKE 1 TABLET BY MOUTH EVERY DAY 90 tablet 2   • lisinopril (PRINIVIL) 5 MG Tab TAKE 1 TABLET BY MOUTH EVERY DAY 90 Tab 2   • hydroCHLOROthiazide (HYDRODIURIL) 25 MG Tab TAKE 1 TABLET BY MOUTH EVERY DAY 90 Tab 2   • citalopram (CELEXA) 20 MG Tab TAKE 1 TABLET BY MOUTH EVERY DAY 90 Tab 2   • celecoxib (CELEBREX) 200 MG Cap Take 200 mg by mouth every day.     • triamcinolone acetonide (KENALOG) 0.1 % Cream APPLY TO AFFECTED AREA (LEGS, TORSO) TWICE A DAY 2 WEEKS ON AND 2 WEEKS OFF, AS NEEDED FOR FLARES     • lansoprazole (PREVACID) 30 MG CAPSULE DELAYED RELEASE Take 1 Cap by mouth every day. 90 Cap 3   • loratadine (CLARITIN) 10 MG Tab Take 1 Tab by mouth every day. 30 Tab    • clobetasol (TEMOVATE) 0.05 % Cream Apply 1 g to affected area(s) 2 Times a Day.     • Cholecalciferol (VITAMIN D3) 5000 UNITS Cap Take 1 Cap by mouth every day.     • BIOTIN PO Take 1,000 Units by mouth every day.       No current facility-administered medications for this visit.       Patient Active Problem List    Diagnosis Date Noted   • Hepatic steatosis 08/25/2020   • Elevated ferritin 08/17/2020   • Folic acid deficiency 08/17/2020   • Osteoarthritis of knee 05/28/2019   • Multiple thyroid nodules 04/25/2019   • Family history of hemochromatosis 04/25/2019   • ASHLY (generalized anxiety disorder) 07/20/2018   • Elevated uric acid in blood 07/20/2018   • Dyslipidemia 06/20/2018   • Gastroesophageal reflux disease 06/20/2018   • Arthralgia 06/20/2018   • Pre-diabetes 06/20/2018   • Lichen sclerosus 06/20/2018       Family History  "  Problem Relation Age of Onset   • Heart Failure Mother    • Cancer Father         prostate   • Other Sister         MS   • Cancer Maternal Grandmother         Leukemia       She  has a past medical history of Arthritis, Complex tear of medial meniscus of left knee as current injury (3/6/2017), Heart burn, High cholesterol, Hypertension, Multiple thyroid nodules, Pain (2/23/17), Pneumonia (1975), and Psychiatric problem.  She  has a past surgical history that includes knee arthroscopy (Left, 5/18/16); hysterectomy laparoscopy (02/2014); tommy by laparoscopy (2001); tubal ligation (Bilateral, 1999); colonoscopy (2015); [id number] (Bilateral, 1982); knee arthroscopy (Right, 3/6/2017); and medial meniscectomy (Right, 3/6/2017).       Objective:   Vitals obtained by patient:  /81   Pulse 88   Temp 36.6 °C (97.9 °F) (Temporal)   Ht 1.753 m (5' 9\")   Wt 108 kg (238 lb)   BMI 35.15 kg/m²     Physical Exam:  Constitutional: Alert, no distress, well-groomed.  Skin: No rashes in visible areas.  Eye: Round. Conjunctiva clear, lids normal. No icterus.   ENMT: Lips pink without lesions, good dentition, moist mucous membranes. Phonation normal.  Neck: No masses, no thyromegaly. Moves freely without pain.  CV: Pulse as reported by patient  Respiratory: Unlabored respiratory effort, no cough or audible wheeze  Psych: Alert and oriented x3, normal affect and mood.       Assessment and Plan:   The following treatment plan was discussed:     1. Polyarthralgia  Ongoing problem. Has had negative VICKIE, negative RF factor, normal sed rate.  Negative syphilis, negative HIV.  Possibly related to elevated ferritin levels. ?Rheumatology ?Hashimoto. R/o gout.  - THYROID PEROXIDASE  (TPO) AB; Future  - URIC ACID, SERUM    2. Elevated ferritin  Recheck since there was an increase from previous level. Heterozygous for hereditary hemochromatosis.   - FERRITIN; Future  - IRON/TOTAL IRON BIND; Future    3. Abnormal urine odor  New problem. " R/o infection.  - MICROALBUMIN CREAT RATIO URINE; Future  - URINALYSIS,CULTURE IF INDICATED; Future    4. Renal cyst  Incidental finding on right upper quadrant liver ultrasound.  - US-RENAL; Future    5. Postmenopausal  ?hormone fluctuations.  - FSH/LH; Future    6. Pre-diabetes  Recheck A1c.   - HEMOGLOBIN A1C; Future    7. Folic acid deficiency  Most recent folic acid 2.5.  No anemia, normocytic.  Recommend folic acid 1 mg daily.        Follow-up: No follow-ups on file.    Face to Face Video Visit:     EMIR David

## 2021-03-15 DIAGNOSIS — Z23 NEED FOR VACCINATION: ICD-10-CM

## 2021-03-24 ENCOUNTER — HOSPITAL ENCOUNTER (OUTPATIENT)
Dept: RADIOLOGY | Facility: MEDICAL CENTER | Age: 56
End: 2021-03-24
Attending: NURSE PRACTITIONER
Payer: COMMERCIAL

## 2021-03-24 DIAGNOSIS — N28.1 RENAL CYST: ICD-10-CM

## 2021-03-24 PROCEDURE — 76775 US EXAM ABDO BACK WALL LIM: CPT

## 2021-03-29 DIAGNOSIS — N28.1 ACQUIRED CYST OF KIDNEY: ICD-10-CM

## 2021-04-04 ENCOUNTER — HOSPITAL ENCOUNTER (OUTPATIENT)
Dept: RADIOLOGY | Facility: MEDICAL CENTER | Age: 56
End: 2021-04-04
Attending: PAIN MEDICINE
Payer: COMMERCIAL

## 2021-04-04 DIAGNOSIS — M54.50 LOW BACK PAIN, UNSPECIFIED BACK PAIN LATERALITY, UNSPECIFIED CHRONICITY, UNSPECIFIED WHETHER SCIATICA PRESENT: ICD-10-CM

## 2021-04-04 PROCEDURE — 72110 X-RAY EXAM L-2 SPINE 4/>VWS: CPT

## 2021-05-02 DIAGNOSIS — F41.0 PANIC ATTACKS: ICD-10-CM

## 2021-05-04 RX ORDER — ALPRAZOLAM 0.25 MG/1
0.25 TABLET ORAL NIGHTLY PRN
Qty: 60 TABLET | Refills: 3 | Status: SHIPPED | OUTPATIENT
Start: 2021-05-04 | End: 2022-07-07 | Stop reason: SDUPTHER

## 2021-05-05 RX ORDER — CITALOPRAM 20 MG/1
TABLET ORAL
Qty: 90 TABLET | Refills: 2 | Status: SHIPPED | OUTPATIENT
Start: 2021-05-05 | End: 2021-09-09

## 2021-06-21 RX ORDER — LANSOPRAZOLE 30 MG/1
CAPSULE, DELAYED RELEASE ORAL
Qty: 90 CAPSULE | Refills: 2 | Status: SHIPPED | OUTPATIENT
Start: 2021-06-21 | End: 2022-03-16

## 2021-07-01 ENCOUNTER — HOSPITAL ENCOUNTER (OUTPATIENT)
Dept: LAB | Facility: MEDICAL CENTER | Age: 56
End: 2021-07-01
Attending: NURSE PRACTITIONER
Payer: COMMERCIAL

## 2021-07-01 DIAGNOSIS — R73.03 PRE-DIABETES: ICD-10-CM

## 2021-07-01 DIAGNOSIS — R82.90 ABNORMAL URINE ODOR: ICD-10-CM

## 2021-07-01 DIAGNOSIS — R79.89 ELEVATED FERRITIN: ICD-10-CM

## 2021-07-01 DIAGNOSIS — M25.50 POLYARTHRALGIA: ICD-10-CM

## 2021-07-01 DIAGNOSIS — Z78.0 POSTMENOPAUSAL: ICD-10-CM

## 2021-07-01 LAB
APPEARANCE UR: CLEAR
BACTERIA #/AREA URNS HPF: ABNORMAL /HPF
BILIRUB UR QL STRIP.AUTO: NEGATIVE
COLOR UR: YELLOW
CREAT UR-MCNC: 140.29 MG/DL
EPI CELLS #/AREA URNS HPF: ABNORMAL /HPF
EST. AVERAGE GLUCOSE BLD GHB EST-MCNC: 131 MG/DL
FERRITIN SERPL-MCNC: 302 NG/ML (ref 10–291)
FSH SERPL-ACNC: 48.3 MIU/ML
GLUCOSE UR STRIP.AUTO-MCNC: NEGATIVE MG/DL
HBA1C MFR BLD: 6.2 % (ref 4–5.6)
HYALINE CASTS #/AREA URNS LPF: ABNORMAL /LPF
IRON SATN MFR SERPL: 26 % (ref 15–55)
IRON SERPL-MCNC: 62 UG/DL (ref 40–170)
KETONES UR STRIP.AUTO-MCNC: NEGATIVE MG/DL
LEUKOCYTE ESTERASE UR QL STRIP.AUTO: ABNORMAL
LH SERPL-ACNC: 29.7 IU/L
MICRO URNS: ABNORMAL
MICROALBUMIN UR-MCNC: 1.3 MG/DL
MICROALBUMIN/CREAT UR: 9 MG/G (ref 0–30)
NITRITE UR QL STRIP.AUTO: NEGATIVE
PH UR STRIP.AUTO: 6 [PH] (ref 5–8)
PROT UR QL STRIP: NEGATIVE MG/DL
RBC # URNS HPF: ABNORMAL /HPF
RBC UR QL AUTO: NEGATIVE
SP GR UR STRIP.AUTO: 1.02
TIBC SERPL-MCNC: 235 UG/DL (ref 250–450)
UIBC SERPL-MCNC: 173 UG/DL (ref 110–370)
UROBILINOGEN UR STRIP.AUTO-MCNC: 0.2 MG/DL
WBC #/AREA URNS HPF: ABNORMAL /HPF

## 2021-07-01 PROCEDURE — 83036 HEMOGLOBIN GLYCOSYLATED A1C: CPT

## 2021-07-01 PROCEDURE — 87086 URINE CULTURE/COLONY COUNT: CPT

## 2021-07-01 PROCEDURE — 83001 ASSAY OF GONADOTROPIN (FSH): CPT

## 2021-07-01 PROCEDURE — 81001 URINALYSIS AUTO W/SCOPE: CPT

## 2021-07-01 PROCEDURE — 86376 MICROSOMAL ANTIBODY EACH: CPT

## 2021-07-01 PROCEDURE — 87186 SC STD MICRODIL/AGAR DIL: CPT

## 2021-07-01 PROCEDURE — 82728 ASSAY OF FERRITIN: CPT

## 2021-07-01 PROCEDURE — 87077 CULTURE AEROBIC IDENTIFY: CPT | Mod: 91

## 2021-07-01 PROCEDURE — 82043 UR ALBUMIN QUANTITATIVE: CPT

## 2021-07-01 PROCEDURE — 82570 ASSAY OF URINE CREATININE: CPT

## 2021-07-01 PROCEDURE — 83550 IRON BINDING TEST: CPT

## 2021-07-01 PROCEDURE — 83002 ASSAY OF GONADOTROPIN (LH): CPT

## 2021-07-01 PROCEDURE — 36415 COLL VENOUS BLD VENIPUNCTURE: CPT

## 2021-07-01 PROCEDURE — 83540 ASSAY OF IRON: CPT

## 2021-07-03 LAB
BACTERIA UR CULT: ABNORMAL
BACTERIA UR CULT: ABNORMAL
SIGNIFICANT IND 70042: ABNORMAL
SITE SITE: ABNORMAL
SOURCE SOURCE: ABNORMAL
THYROPEROXIDASE AB SERPL-ACNC: 0.3 IU/ML (ref 0–9)

## 2021-07-05 DIAGNOSIS — B96.89 URINARY TRACT INFECTION DUE TO KLEBSIELLA SPECIES: ICD-10-CM

## 2021-07-05 DIAGNOSIS — N39.0 URINARY TRACT INFECTION DUE TO KLEBSIELLA SPECIES: ICD-10-CM

## 2021-07-05 RX ORDER — CIPROFLOXACIN 500 MG/1
500 TABLET, FILM COATED ORAL 2 TIMES DAILY
Qty: 10 TABLET | Refills: 0 | Status: SHIPPED | OUTPATIENT
Start: 2021-07-05 | End: 2021-07-10

## 2021-07-05 RX ORDER — IBUPROFEN AND FAMOTIDINE 800; 26.6 MG/1; MG/1
TABLET, COATED ORAL
COMMUNITY
End: 2021-09-09

## 2021-07-15 DIAGNOSIS — B96.89 URINARY TRACT INFECTION DUE TO KLEBSIELLA SPECIES: ICD-10-CM

## 2021-07-15 DIAGNOSIS — N39.0 URINARY TRACT INFECTION DUE TO KLEBSIELLA SPECIES: ICD-10-CM

## 2021-07-19 ENCOUNTER — HOSPITAL ENCOUNTER (OUTPATIENT)
Facility: MEDICAL CENTER | Age: 56
End: 2021-07-19
Attending: NURSE PRACTITIONER
Payer: COMMERCIAL

## 2021-07-19 DIAGNOSIS — N39.0 URINARY TRACT INFECTION DUE TO KLEBSIELLA SPECIES: ICD-10-CM

## 2021-07-19 DIAGNOSIS — B96.89 URINARY TRACT INFECTION DUE TO KLEBSIELLA SPECIES: ICD-10-CM

## 2021-07-19 LAB
APPEARANCE UR: CLEAR
BILIRUB UR QL STRIP.AUTO: NEGATIVE
COLOR UR: YELLOW
GLUCOSE UR STRIP.AUTO-MCNC: NEGATIVE MG/DL
KETONES UR STRIP.AUTO-MCNC: NEGATIVE MG/DL
LEUKOCYTE ESTERASE UR QL STRIP.AUTO: NEGATIVE
MICRO URNS: NORMAL
NITRITE UR QL STRIP.AUTO: NEGATIVE
PH UR STRIP.AUTO: 6 [PH] (ref 5–8)
PROT UR QL STRIP: NEGATIVE MG/DL
RBC UR QL AUTO: NEGATIVE
SP GR UR STRIP.AUTO: 1.02
UROBILINOGEN UR STRIP.AUTO-MCNC: 0.2 MG/DL

## 2021-07-19 PROCEDURE — 81003 URINALYSIS AUTO W/O SCOPE: CPT

## 2021-07-20 DIAGNOSIS — B37.9 YEAST INFECTION: ICD-10-CM

## 2021-07-20 RX ORDER — FLUCONAZOLE 150 MG/1
150 TABLET ORAL DAILY
Qty: 2 TABLET | Refills: 1 | Status: SHIPPED | OUTPATIENT
Start: 2021-07-20 | End: 2022-07-07

## 2021-09-09 ENCOUNTER — TELEMEDICINE (OUTPATIENT)
Dept: MEDICAL GROUP | Facility: MEDICAL CENTER | Age: 56
End: 2021-09-09
Payer: COMMERCIAL

## 2021-09-09 VITALS
BODY MASS INDEX: 36.73 KG/M2 | WEIGHT: 248 LBS | HEART RATE: 97 BPM | SYSTOLIC BLOOD PRESSURE: 132 MMHG | OXYGEN SATURATION: 95 % | DIASTOLIC BLOOD PRESSURE: 74 MMHG | HEIGHT: 69 IN

## 2021-09-09 DIAGNOSIS — F41.1 GENERALIZED ANXIETY DISORDER: ICD-10-CM

## 2021-09-09 DIAGNOSIS — R19.8 ALTERNATING CONSTIPATION AND DIARRHEA: ICD-10-CM

## 2021-09-09 DIAGNOSIS — F41.0 PANIC ATTACKS: ICD-10-CM

## 2021-09-09 DIAGNOSIS — K12.1 MOUTH ULCERS: ICD-10-CM

## 2021-09-09 PROCEDURE — 99213 OFFICE O/P EST LOW 20 MIN: CPT | Mod: 95 | Performed by: NURSE PRACTITIONER

## 2021-09-09 RX ORDER — BUSPIRONE HYDROCHLORIDE 15 MG/1
15 TABLET ORAL 2 TIMES DAILY PRN
Qty: 60 TABLET | Refills: 1 | Status: SHIPPED | OUTPATIENT
Start: 2021-09-09 | End: 2021-12-04

## 2021-09-09 RX ORDER — CITALOPRAM 40 MG/1
40 TABLET ORAL
Qty: 90 TABLET | Refills: 3 | Status: SHIPPED | OUTPATIENT
Start: 2021-09-09 | End: 2022-10-26

## 2021-09-09 ASSESSMENT — FIBROSIS 4 INDEX: FIB4 SCORE: 0.89

## 2021-09-09 NOTE — PROGRESS NOTES
Telemedicine Video Visit: Established Patient   This Remote Face to Face encounter was conducted via Zoom. Given the importance of social distancing and other strategies recommended to reduce the risk of COVID-19 transmission, I am providing medical care to this patient via audio/video visit in place of an in person visit at the request of the patient. Verbal consent to telehealth, risks, benefits, and consequences were discussed. Patient retains the right to withdraw at any time. All existing confidentiality protections apply. The patient has access to all transmitted medical information. No dissemination of any patient images or information to other entities without further written consent.  Subjective:     Chief Complaint   Patient presents with   • Anxiety     Fv   • Other     bowel changes       Shana Dunn is a 56 y.o. female presenting for evaluation and management of:    Patient presents today as she is feeling more anxious, uptight and irritable. No specific triggers. We had increased her Citalopram to 30mg a few weeks ago to see if this can help reduce her anxiety. She has not noticed a significant decrease just yet. She has used Xanax a few times per week for this. It is helpful. She is feeling tired today. Denies heart racing or CP. No SE with the increased dose of Citalopram.     She also mentions she has been having alternating diarrhea and constipation for about 3 weeks. She does have a history having having some mucous present. No blood. No abdominal cramping. No n/v. She also has occasional mouth sores. Hx of colon polyps. Overdue for colonoscopy. Recommend follow up with GI.       ROS   Denies any recent fevers or chills. No nausea or vomiting. No chest pains or shortness of breath.     Allergies   Allergen Reactions   • Penicillins Anaphylaxis   • Zithromax [Azithromycin] Hives and Swelling     Neck and tongue   • Latex Rash   • Crestor  [Rosuvastatin Calcium] Rash   • Crestor [Rosuvastatin  Calcium] Hives   • Sulfa Drugs Anaphylaxis   • Wheat Bran      Gastric distress and skin in mouth sloughs off       Current medicines (including changes today)  Current Outpatient Medications   Medication Sig Dispense Refill   • citalopram (CELEXA) 40 MG Tab Take 1 Tablet by mouth every day. 90 Tablet 3   • busPIRone (BUSPAR) 15 MG tablet Take 1 Tablet by mouth 2 times a day as needed (anxiety). 60 Tablet 1   • fluconazole (DIFLUCAN) 150 MG tablet Take 1 tablet by mouth every day. 2 tablet 1   • lansoprazole (PREVACID) 30 MG CAPSULE DELAYED RELEASE TAKE 1 CAPSULE BY MOUTH EVERY DAY 90 capsule 2   • atorvastatin (LIPITOR) 10 MG Tab TAKE 1 TABLET BY MOUTH EVERY DAY 90 tablet 2   • lisinopril (PRINIVIL) 5 MG Tab TAKE 1 TABLET BY MOUTH EVERY DAY 90 Tab 2   • hydroCHLOROthiazide (HYDRODIURIL) 25 MG Tab TAKE 1 TABLET BY MOUTH EVERY DAY 90 Tab 2   • celecoxib (CELEBREX) 200 MG Cap Take 200 mg by mouth every day.     • triamcinolone acetonide (KENALOG) 0.1 % Cream APPLY TO AFFECTED AREA (LEGS, TORSO) TWICE A DAY 2 WEEKS ON AND 2 WEEKS OFF, AS NEEDED FOR FLARES     • loratadine (CLARITIN) 10 MG Tab Take 1 Tab by mouth every day. 30 Tab    • clobetasol (TEMOVATE) 0.05 % Cream Apply 1 g to affected area(s) 2 Times a Day.     • Cholecalciferol (VITAMIN D3) 5000 UNITS Cap Take 1 Cap by mouth every day.     • BIOTIN PO Take 1,000 Units by mouth every day.       No current facility-administered medications for this visit.       Patient Active Problem List    Diagnosis Date Noted   • Hepatic steatosis 08/25/2020   • Elevated ferritin 08/17/2020   • Folic acid deficiency 08/17/2020   • Osteoarthritis of knee 05/28/2019   • Multiple thyroid nodules 04/25/2019   • Family history of hemochromatosis 04/25/2019   • ASHLY (generalized anxiety disorder) 07/20/2018   • Elevated uric acid in blood 07/20/2018   • Dyslipidemia 06/20/2018   • Gastroesophageal reflux disease 06/20/2018   • Arthralgia 06/20/2018   • Pre-diabetes 06/20/2018   •  "Lichen sclerosus 06/20/2018       Family History   Problem Relation Age of Onset   • Heart Failure Mother    • Cancer Father         prostate   • Other Sister         MS   • Cancer Maternal Grandmother         Leukemia       She  has a past medical history of Arthritis, Complex tear of medial meniscus of left knee as current injury (3/6/2017), Heart burn, High cholesterol, Hypertension, Multiple thyroid nodules, Pain (2/23/17), Pneumonia (1975), and Psychiatric problem.  She  has a past surgical history that includes knee arthroscopy (Left, 5/18/16); hysterectomy laparoscopy (02/2014); tommy by laparoscopy (2001); tubal ligation (Bilateral, 1999); colonoscopy (2015); [id number] (Bilateral, 1982); knee arthroscopy (Right, 3/6/2017); and medial meniscectomy (Right, 3/6/2017).       Objective:   Vitals obtained by patient:  /74   Pulse 97   Ht 1.753 m (5' 9\")   Wt 112 kg (248 lb)   SpO2 95%   BMI 36.62 kg/m²     Physical Exam:  Constitutional: Alert, no distress, well-groomed.  Skin: No rashes in visible areas.  Eye: Round. Conjunctiva clear, lids normal. No icterus.   ENMT: Lips pink without lesions, good dentition, moist mucous membranes. Phonation normal.  Neck: No masses, no thyromegaly. Moves freely without pain.  CV: Pulse as reported by patient  Respiratory: Unlabored respiratory effort, no cough or audible wheeze  Psych: Alert and oriented x3, normal affect and mood.       Assessment and Plan:   The following treatment plan was discussed:     1. ASHLY (generalized anxiety disorder)  Chronic. Not well controlled at this time. Increase Citalopram to 40mg, trial of Buspirone. Follow up 3 months.   - citalopram (CELEXA) 40 MG Tab; Take 1 Tablet by mouth every day.  Dispense: 90 Tablet; Refill: 3  - busPIRone (BUSPAR) 15 MG tablet; Take 1 Tablet by mouth 2 times a day as needed (anxiety).  Dispense: 60 Tablet; Refill: 1    2. Panic attacks  Chronic. Taking Xanax for this a few times per week. No ADR's " reported. Does not need refills today. Continue to use this sparingly.    - citalopram (CELEXA) 40 MG Tab; Take 1 Tablet by mouth every day.  Dispense: 90 Tablet; Refill: 3  - busPIRone (BUSPAR) 15 MG tablet; Take 1 Tablet by mouth 2 times a day as needed (anxiety).  Dispense: 60 Tablet; Refill: 1    3. Alternating constipation and diarrhea  Acute problem-hx of GI issues in the past. has hx of colon polyps-overdue for colonoscopy. Recommend follow up with GI for further evaluation. Patient reports she will reach out for f/u appt with [facility]. Symptoms may be r/t IBD such as Crohn's. She also suffers from oral ulcers. Overdue for Colonoscopy. Follow up after GI.     4. Mouth ulcers  Chronic problem for patient. Has intermittent flare ups. No clear etiology. May possibly be r/t to food allergy, ?crohns, ?bechets. Recommend follow up with gi.      Follow-up: Return in about 3 months (around 12/9/2021) for after GI appt .    Face to Face Video Visit:     EMIR David

## 2021-11-01 RX ORDER — LISINOPRIL 5 MG/1
TABLET ORAL
Qty: 90 TABLET | Refills: 2 | Status: SHIPPED | OUTPATIENT
Start: 2021-11-01 | End: 2022-07-26

## 2021-11-01 RX ORDER — HYDROCHLOROTHIAZIDE 25 MG/1
TABLET ORAL
Qty: 90 TABLET | Refills: 2 | Status: SHIPPED | OUTPATIENT
Start: 2021-11-01 | End: 2022-07-26

## 2021-11-10 RX ORDER — ATORVASTATIN CALCIUM 10 MG/1
TABLET, FILM COATED ORAL
Qty: 90 TABLET | Refills: 2 | Status: SHIPPED | OUTPATIENT
Start: 2021-11-10 | End: 2022-07-26

## 2022-01-26 ENCOUNTER — HOSPITAL ENCOUNTER (OUTPATIENT)
Facility: MEDICAL CENTER | Age: 57
End: 2022-01-26
Attending: ORTHOPAEDIC SURGERY
Payer: COMMERCIAL

## 2022-01-26 PROCEDURE — U0003 INFECTIOUS AGENT DETECTION BY NUCLEIC ACID (DNA OR RNA); SEVERE ACUTE RESPIRATORY SYNDROME CORONAVIRUS 2 (SARS-COV-2) (CORONAVIRUS DISEASE [COVID-19]), AMPLIFIED PROBE TECHNIQUE, MAKING USE OF HIGH THROUGHPUT TECHNOLOGIES AS DESCRIBED BY CMS-2020-01-R: HCPCS

## 2022-01-26 PROCEDURE — U0005 INFEC AGEN DETEC AMPLI PROBE: HCPCS

## 2022-01-27 LAB
COVID ORDER STATUS COVID19: NORMAL
SARS-COV-2 RNA RESP QL NAA+PROBE: DETECTED
SPECIMEN SOURCE: ABNORMAL

## 2022-02-22 RX ORDER — CITALOPRAM 20 MG/1
TABLET ORAL
Qty: 90 TABLET | Refills: 2 | Status: SHIPPED | OUTPATIENT
Start: 2022-02-22 | End: 2022-07-07

## 2022-03-16 RX ORDER — LANSOPRAZOLE 30 MG/1
CAPSULE, DELAYED RELEASE ORAL
Qty: 90 CAPSULE | Refills: 2 | Status: SHIPPED | OUTPATIENT
Start: 2022-03-16 | End: 2022-12-09

## 2022-07-07 ENCOUNTER — TELEMEDICINE (OUTPATIENT)
Dept: MEDICAL GROUP | Facility: MEDICAL CENTER | Age: 57
End: 2022-07-07
Payer: COMMERCIAL

## 2022-07-07 VITALS — HEIGHT: 69 IN | WEIGHT: 249 LBS | BODY MASS INDEX: 36.88 KG/M2

## 2022-07-07 DIAGNOSIS — R73.03 PREDIABETES: ICD-10-CM

## 2022-07-07 DIAGNOSIS — E53.8 FOLIC ACID DEFICIENCY: ICD-10-CM

## 2022-07-07 DIAGNOSIS — E66.9 OBESITY (BMI 30-39.9): ICD-10-CM

## 2022-07-07 DIAGNOSIS — E78.5 DYSLIPIDEMIA: ICD-10-CM

## 2022-07-07 DIAGNOSIS — F41.0 PANIC ATTACKS: ICD-10-CM

## 2022-07-07 DIAGNOSIS — Z12.31 ENCOUNTER FOR SCREENING MAMMOGRAM FOR BREAST CANCER: ICD-10-CM

## 2022-07-07 DIAGNOSIS — F41.1 GENERALIZED ANXIETY DISORDER: ICD-10-CM

## 2022-07-07 DIAGNOSIS — K76.0 HEPATIC STEATOSIS: ICD-10-CM

## 2022-07-07 DIAGNOSIS — R79.89 ELEVATED FERRITIN: ICD-10-CM

## 2022-07-07 DIAGNOSIS — R19.8 ALTERNATING CONSTIPATION AND DIARRHEA: ICD-10-CM

## 2022-07-07 PROCEDURE — 99214 OFFICE O/P EST MOD 30 MIN: CPT | Mod: 95 | Performed by: NURSE PRACTITIONER

## 2022-07-07 RX ORDER — METHYLPREDNISOLONE 4 MG/1
TABLET ORAL
COMMUNITY
Start: 2022-06-28 | End: 2022-08-25

## 2022-07-07 RX ORDER — ALPRAZOLAM 0.25 MG/1
0.25 TABLET ORAL NIGHTLY PRN
Qty: 60 TABLET | Refills: 0 | Status: SHIPPED | OUTPATIENT
Start: 2022-07-07 | End: 2022-08-06

## 2022-07-07 ASSESSMENT — FIBROSIS 4 INDEX: FIB4 SCORE: 0.91

## 2022-07-07 NOTE — PROGRESS NOTES
Telemedicine Video Visit: Established Patient   This Remote Face to Face encounter was conducted via Zoom. Given the importance of social distancing and other strategies recommended to reduce the risk of COVID-19 transmission, I am providing medical care to this patient via audio/video visit in place of an in person visit at the request of the patient. Verbal consent to telehealth, risks, benefits, and consequences were discussed. Patient retains the right to withdraw at any time. All existing confidentiality protections apply. The patient has access to all transmitted medical information. No dissemination of any patient images or information to other entities without further written consent.  Subjective:     Chief Complaint   Patient presents with   • Requesting Labs   • Anxiety     Xanax Refill       Shana Dunn is a 57 y.o. female presenting for evaluation and management of:    Panic attacks  Requesting refill on Xanax.  Last fill per  was in May 2021.  Does not use inappropriately.    ASHLY  States had hot flashes on the buspirone.  Continues to take Celexa.    Dyslipidemia  Known problem.  Currently taking atorvastatin.  Overdue for follow-up on lipid panel.    Hepatic steatosis  Present on ultrasound from August 2020.    Prediabetes  Last A1c 6.2%.     Folic acid   Taking folic acid for this.  Previously no anemia, normocytic.    Elevated ferritin  Known issue.  Last ferritin level at 302.  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     Obesity (BMI 30-39.9)  Ongoing problem for the patient.  Frustrated by her weight.  Has not been able to participate in any regular exercise she has been suffering from back pain.  Does not feel if she overeats or is eating poorly, not losing weight.    Alternating constipation and diarrhea  Has not followed up with GI since last visit.  States continues to have similar symptoms as discussed at last visit.  Recommend following up with GI.  She states that she will [facility] to [facility]. Overdue for colonoscopy.      ROS   Denies any recent fevers or chills. No nausea or vomiting. No chest pains or shortness of breath.     Allergies   Allergen Reactions   • Penicillins Anaphylaxis   • Zithromax [Azithromycin] Hives and Swelling     Neck and tongue   • Latex Rash   • Crestor  [Rosuvastatin Calcium] Rash   • Crestor [Rosuvastatin Calcium] Hives   • Sulfa Drugs Anaphylaxis   • Wheat Bran      Gastric distress and skin in mouth sloughs off       Current medicines (including changes today)  Current Outpatient Medications   Medication Sig Dispense Refill   • methylPREDNISolone (MEDROL DOSEPAK) 4 MG Tablet Therapy Pack TAKE 6 TABLETS ON DAY 1 AS DIRECTED ON PACKAGE AND DECREASE BY 1 TAB EACH DAY FOR A TOTAL OF 6 DAYS     • ALPRAZolam (XANAX) 0.25 MG Tab Take 1 Tablet by mouth at bedtime as needed for Sleep or Anxiety for up to 30 days. 60 Tablet 0   • lansoprazole (PREVACID) 30 MG CAPSULE DELAYED RELEASE TAKE 1 CAPSULE BY MOUTH EVERY DAY 90 Capsule 2   • atorvastatin (LIPITOR) 10 MG Tab TAKE 1 TABLET BY MOUTH EVERY DAY 90 Tablet 2   • hydroCHLOROthiazide (HYDRODIURIL) 25 MG Tab TAKE 1 TABLET BY MOUTH EVERY DAY 90 Tablet 2   • lisinopril (PRINIVIL) 5 MG Tab TAKE 1 TABLET BY MOUTH EVERY DAY 90 Tablet 2   • citalopram (CELEXA) 40 MG Tab Take 1 Tablet by mouth every day. 90 Tablet 3   • celecoxib (CELEBREX) 200 MG Cap Take 200 mg by mouth every day.     • triamcinolone acetonide (KENALOG) 0.1 % Cream APPLY TO AFFECTED AREA (LEGS, TORSO) TWICE A DAY 2 WEEKS ON AND 2 WEEKS OFF, AS NEEDED FOR FLARES     • loratadine (CLARITIN) 10 MG Tab Take 1 Tab by mouth every day. 30 Tab    • clobetasol (TEMOVATE) 0.05 % Cream Apply 1 g to affected area(s) 2 Times a Day.     • Cholecalciferol (VITAMIN D3) 5000 UNITS Cap Take 1 Cap by mouth every day.     • BIOTIN PO Take 1,000 Units by mouth every day.       No current facility-administered medications for this visit.       Patient Active  "Problem List    Diagnosis Date Noted   • Obesity (BMI 30-39.9) 07/07/2022   • Hepatic steatosis 08/25/2020   • Elevated ferritin 08/17/2020   • Folic acid deficiency 08/17/2020   • Osteoarthritis of knee 05/28/2019   • Multiple thyroid nodules 04/25/2019   • Family history of hemochromatosis 04/25/2019   • ASHLY (generalized anxiety disorder) 07/20/2018   • Elevated uric acid in blood 07/20/2018   • Dyslipidemia 06/20/2018   • Gastroesophageal reflux disease 06/20/2018   • Arthralgia 06/20/2018   • Pre-diabetes 06/20/2018   • Lichen sclerosus 06/20/2018       Family History   Problem Relation Age of Onset   • Heart Failure Mother    • Cancer Father         prostate   • Other Sister         MS   • Cancer Maternal Grandmother         Leukemia       She  has a past medical history of Arthritis, Complex tear of medial meniscus of left knee as current injury (3/6/2017), Heart burn, High cholesterol, Hypertension, Multiple thyroid nodules, Pain (2/23/17), Pneumonia (1975), and Psychiatric problem.  She  has a past surgical history that includes knee arthroscopy (Left, 5/18/16); hysterectomy laparoscopy (02/2014); tommy by laparoscopy (2001); tubal ligation (Bilateral, 1999); colonoscopy (2015); [id number] (Bilateral, 1982); knee arthroscopy (Right, 3/6/2017); and meniscectomy, knee, medial (Right, 3/6/2017).       Objective:   Vitals obtained by patient:  Ht 1.753 m (5' 9\")   Wt 113 kg (249 lb)   BMI 36.77 kg/m²     Physical Exam:  Constitutional: Alert, no distress, well-groomed.  Skin: No rashes in visible areas.  Eye: Round. Conjunctiva clear, lids normal. No icterus.   ENMT: Lips pink without lesions, good dentition, moist mucous membranes. Phonation normal.  Neck: No masses, no thyromegaly. Moves freely without pain.  CV: Pulse as reported by patient  Respiratory: Unlabored respiratory effort, no cough or audible wheeze  Psych: Alert and oriented x3, normal affect and mood.       Assessment and Plan:   The following " treatment plan was discussed:     1. ASHLY (generalized anxiety disorder)  Chronic, stable with Celexa.  Did not tolerate buspirone.    2. Panic attacks  Stable.  Occasionally using Xanax for this.   checked.  No inconsistencies.  Have sent prescription to pharmacy.  - ALPRAZolam (XANAX) 0.25 MG Tab; Take 1 Tablet by mouth at bedtime as needed for Sleep or Anxiety for up to 30 days.  Dispense: 60 Tablet; Refill: 0    3. Dyslipidemia  On statin.  Due for recheck of labs.  Consider increasing dose if tolerates.  - Comp Metabolic Panel; Future  - TSH WITH REFLEX TO FT4; Future  - Lipid Profile; Future    4. Hepatic steatosis  Known problem.  Continue work on heart healthy diet, weight loss and exercise.    5. Prediabetes  Recheck A1c.  - HEMOGLOBIN A1C; Future    6. Obesity (BMI 30-39.9)  Chronic problem for the patient.  Other comorbidities include hepatic steatosis, dyslipidemia, prediabetes.  Patient is interested in referral to [facility] for medical weight loss treatment.  Will fax over the appropriate referral to [facility].  - Patient identified as having weight management issue.  Appropriate orders and counseling given.    7. Encounter for screening mammogram for breast cancer  - MA-SCREENING MAMMO BILAT W/TOMOSYNTHESIS W/CAD; Future    8. Alternating constipation and diarrhea  Ongoing problem -recommend following up with GI for this.  States she will try to schedule.    9. Folic acid deficiency  Recheck lab  - FOLATE; Future    10. Elevated ferritin  Recheck lab  - FERRITIN; Future      Follow-up: Return in about 6 months (around 1/7/2023) for Lab results.    Face to Face Video Visit:     EMIR David

## 2022-07-07 NOTE — LETTER
[facility]  EYAD DavidRKIERSTEN.  [address] [facility] [address]  [place] [address]  Fax: [phone]   Authorization for Release/Disclosure of   Protected Health Information   Name: VIDHI LANG : 1965 SSN: [id number]   Address: [address] [place] [id number] Phone:    [phone] (home) [phone] (work)   I authorize the entity listed below to release/disclose the PHI below to:   [facility]/EMIR David and EMIR David   Provider or Entity Name:  [facility]   Address   City, State, Zip   Phone:      Fax:     Reason for request: continuity of care   Information to be released:    [x  ] LAST COLONOSCOPY,  including any PATH REPORT and follow-up  [  ] LAST FIT/COLOGUARD RESULT [  ] LAST DEXA  [  ] LAST MAMMOGRAM  [  ] LAST PAP  [  ] LAST LABS [  ] RETINA EXAM REPORT  [  ] IMMUNIZATION RECORDS  [ x ] Release all info      [  ] Check here and initial the line next to each item to release ALL health information INCLUDING  _____ Care and treatment for drug and / or alcohol abuse  _____ HIV testing, infection status, or AIDS  _____ Genetic Testing    DATES OF SERVICE OR TIME PERIOD TO BE DISCLOSED: _____________  I understand and acknowledge that:  * This Authorization may be revoked at any time by you in writing, except if your health information has already been used or disclosed.  * Your health information that will be used or disclosed as a result of you signing this authorization could be re-disclosed by the recipient. If this occurs, your re-disclosed health information may no longer be protected by State or Federal laws.  * You may refuse to sign this Authorization. Your refusal will not affect your ability to obtain treatment.  * This Authorization becomes effective upon signing and will  on (date) __________.      If no date is indicated, this Authorization will  one (1) year from the signature date.    Name: Vidhi Gutierrez  Shaun    Signature:   Date:     7/7/2022       PLEASE FAX REQUESTED RECORDS BACK TO: [phone]

## 2022-07-26 RX ORDER — LISINOPRIL 5 MG/1
TABLET ORAL
Qty: 90 TABLET | Refills: 2 | Status: SHIPPED | OUTPATIENT
Start: 2022-07-26 | End: 2023-04-19

## 2022-07-26 RX ORDER — ATORVASTATIN CALCIUM 10 MG/1
TABLET, FILM COATED ORAL
Qty: 90 TABLET | Refills: 2 | Status: SHIPPED | OUTPATIENT
Start: 2022-07-26 | End: 2022-12-08

## 2022-07-26 RX ORDER — HYDROCHLOROTHIAZIDE 25 MG/1
TABLET ORAL
Qty: 90 TABLET | Refills: 2 | Status: SHIPPED | OUTPATIENT
Start: 2022-07-26 | End: 2023-04-19

## 2022-08-12 ENCOUNTER — HOSPITAL ENCOUNTER (OUTPATIENT)
Dept: LAB | Facility: MEDICAL CENTER | Age: 57
End: 2022-08-12
Attending: NURSE PRACTITIONER
Payer: COMMERCIAL

## 2022-08-12 DIAGNOSIS — E78.5 DYSLIPIDEMIA: ICD-10-CM

## 2022-08-12 DIAGNOSIS — R79.89 ELEVATED FERRITIN: ICD-10-CM

## 2022-08-12 DIAGNOSIS — E53.8 FOLIC ACID DEFICIENCY: ICD-10-CM

## 2022-08-12 DIAGNOSIS — R73.03 PREDIABETES: ICD-10-CM

## 2022-08-12 LAB
ALBUMIN SERPL BCP-MCNC: 4.8 G/DL (ref 3.2–4.9)
ALBUMIN/GLOB SERPL: 2.4 G/DL
ALP SERPL-CCNC: 102 U/L (ref 30–99)
ALT SERPL-CCNC: 25 U/L (ref 2–50)
ANION GAP SERPL CALC-SCNC: 13 MMOL/L (ref 7–16)
AST SERPL-CCNC: 21 U/L (ref 12–45)
BILIRUB SERPL-MCNC: 0.9 MG/DL (ref 0.1–1.5)
BUN SERPL-MCNC: 15 MG/DL (ref 8–22)
CALCIUM SERPL-MCNC: 10.2 MG/DL (ref 8.5–10.5)
CHLORIDE SERPL-SCNC: 101 MMOL/L (ref 96–112)
CHOLEST SERPL-MCNC: 197 MG/DL (ref 100–199)
CO2 SERPL-SCNC: 24 MMOL/L (ref 20–33)
CREAT SERPL-MCNC: 0.67 MG/DL (ref 0.5–1.4)
EST. AVERAGE GLUCOSE BLD GHB EST-MCNC: 131 MG/DL
FASTING STATUS PATIENT QL REPORTED: NORMAL
FERRITIN SERPL-MCNC: 343 NG/ML (ref 10–291)
GFR SERPLBLD CREATININE-BSD FMLA CKD-EPI: 102 ML/MIN/1.73 M 2
GLOBULIN SER CALC-MCNC: 2 G/DL (ref 1.9–3.5)
GLUCOSE SERPL-MCNC: 114 MG/DL (ref 65–99)
HBA1C MFR BLD: 6.2 % (ref 4–5.6)
HDLC SERPL-MCNC: 45 MG/DL
LDLC SERPL CALC-MCNC: 102 MG/DL
POTASSIUM SERPL-SCNC: 4.1 MMOL/L (ref 3.6–5.5)
PROT SERPL-MCNC: 6.8 G/DL (ref 6–8.2)
SODIUM SERPL-SCNC: 138 MMOL/L (ref 135–145)
TRIGL SERPL-MCNC: 249 MG/DL (ref 0–149)
TSH SERPL DL<=0.005 MIU/L-ACNC: 2.23 UIU/ML (ref 0.38–5.33)

## 2022-08-12 PROCEDURE — 80061 LIPID PANEL: CPT

## 2022-08-12 PROCEDURE — 84443 ASSAY THYROID STIM HORMONE: CPT

## 2022-08-12 PROCEDURE — 36415 COLL VENOUS BLD VENIPUNCTURE: CPT

## 2022-08-12 PROCEDURE — 83036 HEMOGLOBIN GLYCOSYLATED A1C: CPT

## 2022-08-12 PROCEDURE — 82728 ASSAY OF FERRITIN: CPT

## 2022-08-12 PROCEDURE — 80053 COMPREHEN METABOLIC PANEL: CPT

## 2022-08-12 PROCEDURE — 82746 ASSAY OF FOLIC ACID SERUM: CPT

## 2022-08-13 LAB — FOLATE SERPL-MCNC: <2 NG/ML

## 2022-08-24 ENCOUNTER — HOSPITAL ENCOUNTER (OUTPATIENT)
Dept: RADIOLOGY | Facility: MEDICAL CENTER | Age: 57
End: 2022-08-24
Attending: PAIN MEDICINE
Payer: COMMERCIAL

## 2022-08-24 DIAGNOSIS — M54.16 LUMBAR RADICULOPATHY: ICD-10-CM

## 2022-08-24 PROCEDURE — 72100 X-RAY EXAM L-S SPINE 2/3 VWS: CPT

## 2022-08-29 ENCOUNTER — TELEMEDICINE (OUTPATIENT)
Dept: MEDICAL GROUP | Facility: MEDICAL CENTER | Age: 57
End: 2022-08-29
Payer: COMMERCIAL

## 2022-08-29 VITALS — TEMPERATURE: 98.4 F | HEIGHT: 69 IN | BODY MASS INDEX: 37.18 KG/M2 | WEIGHT: 251 LBS

## 2022-08-29 DIAGNOSIS — D18.01 CHERRY ANGIOMA: ICD-10-CM

## 2022-08-29 DIAGNOSIS — E78.5 DYSLIPIDEMIA: ICD-10-CM

## 2022-08-29 DIAGNOSIS — M25.50 POLYARTHRALGIA: ICD-10-CM

## 2022-08-29 DIAGNOSIS — E53.8 FOLIC ACID DEFICIENCY: ICD-10-CM

## 2022-08-29 DIAGNOSIS — F43.9 STRESS: ICD-10-CM

## 2022-08-29 DIAGNOSIS — Z13.31 POSITIVE DEPRESSION SCREENING: ICD-10-CM

## 2022-08-29 DIAGNOSIS — R19.8 ALTERNATING CONSTIPATION AND DIARRHEA: ICD-10-CM

## 2022-08-29 DIAGNOSIS — R79.89 ELEVATED FERRITIN: ICD-10-CM

## 2022-08-29 DIAGNOSIS — R73.03 PREDIABETES: ICD-10-CM

## 2022-08-29 PROBLEM — K76.0 HEPATIC STEATOSIS: Chronic | Status: ACTIVE | Noted: 2020-08-25

## 2022-08-29 PROBLEM — K21.9 GASTROESOPHAGEAL REFLUX DISEASE: Chronic | Status: ACTIVE | Noted: 2018-06-20

## 2022-08-29 PROBLEM — E66.9 OBESITY (BMI 30-39.9): Chronic | Status: ACTIVE | Noted: 2022-07-07

## 2022-08-29 PROCEDURE — 99214 OFFICE O/P EST MOD 30 MIN: CPT | Mod: 95 | Performed by: NURSE PRACTITIONER

## 2022-08-29 RX ORDER — FOLIC ACID 1 MG/1
1 TABLET ORAL DAILY
Qty: 90 TABLET | Refills: 3 | Status: SHIPPED | OUTPATIENT
Start: 2022-08-29 | End: 2023-06-13 | Stop reason: SDUPTHER

## 2022-08-29 RX ORDER — FOLIC ACID 1 MG/1
TABLET ORAL
COMMUNITY
Start: 2022-08-15 | End: 2022-12-08

## 2022-08-29 RX ORDER — ALPRAZOLAM 0.25 MG/1
TABLET ORAL
COMMUNITY
End: 2022-11-27

## 2022-08-29 ASSESSMENT — PATIENT HEALTH QUESTIONNAIRE - PHQ9
5. POOR APPETITE OR OVEREATING: 3 - NEARLY EVERY DAY
CLINICAL INTERPRETATION OF PHQ2 SCORE: 6
SUM OF ALL RESPONSES TO PHQ QUESTIONS 1-9: 24

## 2022-08-29 NOTE — PROGRESS NOTES
Chief Complaint   Patient presents with    Lab Results     DOS: 8/12/2022     Telemedicine Video Visit: Established Patient   This Remote Face to Face encounter was conducted via Zoom. Given the importance of social distancing and other strategies recommended to reduce the risk of COVID-19 transmission, I am providing medical care to this patient via audio/video visit in place of an in person visit at the request of the patient. Verbal consent to telehealth, risks, benefits, and consequences were discussed. Patient retains the right to withdraw at any time. All existing confidentiality protections apply. The patient has access to all transmitted medical information. No dissemination of any patient images or information to other entities without further written consent.  Subjective:     Presents to review labs.  She also mentions having some red little skin lesions.  Have shown her a picture of cherry angioma and she is quite certain that this is the same as that she has on her body.  Have discussed the benign process of cherry angiomas.  Consider referral to dermatology if these become bothersome for her.    Polyarthralgia  Ongoing issue.   -Previous work-up includes labs negative for RF, syphilis, HIV,VICKIE on multiple occasions.  Normal sed rate.  Does have elevated ferritin.    Dyslipidemia  Most recent lipid panel with improvement of LDL as well as triglycerides.  Reports compliance with atorvastatin 10 mg.  Have discussed consider increasing her dose however will hold at this time as she is experiencing some polyarthralgia.  There is a possibility that she could have statin induced myalgias however low suspicion for this at this time given other symptoms at this time.  She may consider holding the medication for at least 2 weeks to see if she is having any improvement of her polyarthralgia however.   Latest Reference Range & Units 7/17/20 12:29 8/12/22 10:37   Cholesterol,Tot 100 - 199 mg/dL 275 (H) 197    Triglycerides 0 - 149 mg/dL 334 (H) 249 (H)   HDL >=40 mg/dL 41 45   LDL <100 mg/dL 167 (H) 102 (H)     Prediabetes  Most recent A1c 6.2%.  Fasting glucose 114.   We have briefly discussed starting on metformin even 3 times a week however at this time we will hold and recheck her A1c in 3 months as patient is currently going to follow back up with GI for her alternating constipation and diarrhea.     Latest Reference Range & Units 7/17/20 12:29 7/1/21 08:23 8/12/22 10:37   Glycohemoglobin 4.0 - 5.6 % 6.1 (H) 6.2 (H) 6.2 (H)     Folic acid   Suboptimal on recent labs despite previous recommendations for taking folic acid 1 mg daily.  Patient does mention she was taking a 250 mcg supplement and then recently doubled the dose.    -Recommend 1 mg of folic acid and have sent to the pharmacy.    Have discussed some of her symptoms being be secondary to folic acid deficiency-such as her mouth sores.  Previously no anemia, normocytic.      Latest Reference Range & Units 7/17/20 12:29 8/12/22 10:37   Folate -Folic Acid >4.0 ng/mL 2.5 ! <2.0 !     Elevated ferritin  Known issue.  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   85 percent of hereditary hemochromatosis in   [place] is caused by C282Y homozygosity and 5 percent   by C282Y/H63D compound heterozygosity.   Has GI appointment this Thursday.      Latest Reference Range & Units 7/17/20 12:29 7/1/21 08:23 8/12/22 10:37   Ferritin 10.0 - 291.0 ng/mL 381.0 (H) 302.0 (H) 343.0 (H)      Alternating constipation and diarrhea  This has been an ongoing issue for some time.  She does have a history of having colon polyps and is overdue for colonoscopy.  She has an appointment this Thursday.  Would likely benefit from an EGD and she is overdue for colonoscopy.  Recent TSH within normal limits.      Latest Reference Range & Units 8/12/22 10:37   TSH 0.380 - 5.330 uIU/mL 2.230     Positive depression screening.   Present on visit today.  At this time she believes this is mostly  situational.  Undergoing a lot of work stressors, family life stressors.  Denies any suicidal ideations.  Denies any plan.    She will be visiting her mother in [place] who is 93 years old and is excited for this.  Hopeful to have some reduction of her stress.  We will continue this conversation when she follows back up.      Little interest or pleasure in doing things?  3 - nearly every day   Feeling down, depressed , or hopeless? 3 - nearly every day   Trouble falling or staying asleep, or sleeping too much?  3 - nearly every day   Feeling tired or having little energy?  3 - nearly every day   Poor appetite or overeating?  3 - nearly every day   Feeling bad about yourself - or that you are a failure or have let yourself or your family down? 3 - nearly every day   Trouble concentrating on things, such as reading the newspaper or watching television? 3 - nearly every day   Moving or speaking so slowly that other people could have noticed.  Or the opposite - being so fidgety or restless that you have been moving around a lot more than usual?  3 - nearly every day   Thoughts that you would be better off dead, or of hurting yourself?  0 - not at all   Patient Health Questionnaire Score: 24       If depressive symptoms identified deferred to follow up visit unless specifically addressed in assesment and plan.    Interpretation of PHQ-9 Total Score   Score Severity   1-4 No Depression   5-9 Mild Depression   10-14 Moderate Depression   15-19 Moderately Severe Depression   20-27 Severe Depression       ROS: : see above      Current Outpatient Medications:     ALPRAZolam (XANAX) 0.25 MG Tab, , Disp: , Rfl:     folic acid (FOLVITE) 1 MG Tab, , Disp: , Rfl:     folic acid (FOLVITE) 1 MG Tab, Take 1 Tablet by mouth every day., Disp: 90 Tablet, Rfl: 3    hydroCHLOROthiazide (HYDRODIURIL) 25 MG Tab, TAKE 1 TABLET BY MOUTH EVERY DAY, Disp: 90 Tablet, Rfl: 2    lisinopril (PRINIVIL) 5 MG Tab, TAKE 1 TABLET BY MOUTH EVERY DAY,  Disp: 90 Tablet, Rfl: 2    atorvastatin (LIPITOR) 10 MG Tab, TAKE 1 TABLET BY MOUTH EVERY DAY, Disp: 90 Tablet, Rfl: 2    lansoprazole (PREVACID) 30 MG CAPSULE DELAYED RELEASE, TAKE 1 CAPSULE BY MOUTH EVERY DAY, Disp: 90 Capsule, Rfl: 2    citalopram (CELEXA) 40 MG Tab, Take 1 Tablet by mouth every day., Disp: 90 Tablet, Rfl: 3    celecoxib (CELEBREX) 200 MG Cap, Take 200 mg by mouth every day., Disp: , Rfl:     loratadine (CLARITIN) 10 MG Tab, Take 1 Tab by mouth every day., Disp: 30 Tab, Rfl:     Cholecalciferol (VITAMIN D3) 5000 UNITS Cap, Take 1 Cap by mouth every day., Disp: , Rfl:     BIOTIN PO, Take 1,000 Units by mouth every day., Disp: , Rfl:     Tirzepatide (MOUNJARO) 2.5 MG/0.5ML Solution Pen-injector, Inject 0.5 mL under the skin every 7 days. For 30 days, Disp: 2 mL, Rfl: 0    Tirzepatide (MOUNJARO) 5 MG/0.5ML Solution Pen-injector, Inject 0.5 mL under the skin every 7 days., Disp: 2 mL, Rfl: 1    Allergies   Allergen Reactions    Penicillins Anaphylaxis    Zithromax [Azithromycin] Hives and Swelling     Neck and tongue    Latex Rash    Crestor  [Rosuvastatin Calcium] Rash    Crestor [Rosuvastatin Calcium] Hives    Sulfa Drugs Anaphylaxis    Wheat Bran      Gastric distress and skin in mouth sloughs off       Past Medical History:   Diagnosis Date    Arthritis     bilateral knee    Complex tear of medial meniscus of left knee as current injury 3/6/2017    Heart burn     chronic    High cholesterol     Hypertension     Multiple thyroid nodules     Pain 2/23/17    right knee    Pneumonia 1975    Psychiatric problem     anxiety     Past Surgical History:   Procedure Laterality Date    KNEE ARTHROSCOPY Right 3/6/2017    Procedure: KNEE ARTHROSCOPY;  Surgeon: Mati Wagoner M.D.;  [facility];  Service:     MENISCECTOMY, KNEE, MEDIAL Right 3/6/2017    Procedure: MEDIAL MENISCECTOMY - PARTIAL;  Surgeon: Mati Wagoner M.D.;  Location: [facility];  Service:     KNEE  "ARTHROSCOPY Left 16    COLONOSCOPY  2015    HYSTERECTOMY LAPAROSCOPY  2014    ovaries spared    CAITY BY LAPAROSCOPY      TUBAL LIGATION Bilateral     [id number] Bilateral 1982     Family History   Problem Relation Age of Onset    Heart Failure Mother     Cancer Father         prostate    Other Sister         MS    Cancer Maternal Grandmother         Leukemia     Social History     Socioeconomic History    Marital status:      Spouse name: Not on file    Number of children: Not on file    Years of education: Not on file    Highest education level: Not on file   Occupational History    Not on file   Tobacco Use    Smoking status: Former     Packs/day: 0.50     Years: 20.00     Pack years: 10.00     Types: Cigarettes     Quit date: 2008     Years since quittin.7    Smokeless tobacco: Never   Vaping Use    Vaping Use: Never used   Substance and Sexual Activity    Alcohol use: Yes     Comment: 2 glasses per week    Drug use: No    Sexual activity: Yes     Partners: Male     Comment:    Other Topics Concern    Not on file   Social History Narrative    Not on file     Social Determinants of Health     Financial Resource Strain: Not on file   Food Insecurity: Not on file   Transportation Needs: Not on file   Physical Activity: Not on file   Stress: Not on file   Social Connections: Not on file   Intimate Partner Violence: Not on file   Housing Stability: Not on file       Objective:     Vitals: Temp 36.9 °C (98.4 °F) (Temporal)   Ht 1.753 m (5' 9\")   Wt 114 kg (251 lb)   BMI 37.07 kg/m²    Physical Exam:  Constitutional: Alert, no distress, well-groomed.  Skin: No rashes in visible areas.  Eye: Round. Conjunctiva clear, lids normal. No icterus.   ENMT: Lips pink without lesions, good dentition, moist mucous membranes. Phonation normal.  Neck: No masses, no thyromegaly. Moves freely without pain.  CV: Pulse as reported by patient  Respiratory: Unlabored respiratory effort, no cough " or audible wheeze  Psych: Alert and oriented x3, normal affect and mood.     Assessment/Plan:     Shana was seen today for lab results.    Diagnoses and all orders for this visit:    Dyslipidemia  Improving with statin. Continue.    Prediabetes  A1c now elevated. Consider starting on Metformin. Have briefly discussed GLp1.    Folic acid deficiency  Recommend 1mg of Folic Acid supplement.   -     folic acid (FOLVITE) 1 MG Tab; Take 1 Tablet by mouth every day.    Elevated ferritin  Ongoing issue. Has GI appointment this week.     Polyarthralgia  Ongoing issue.     Positive depression screening  Stress  Ongoing issue. More situational. No SI. Monitor.     Cherry angioma  Reassurance provided. Consider referral to Dermatology.     Alternating constipation and diarrhea  Ongoing issue. TSH normal. Has GI appt this week.       Return in about 4 weeks (around 9/26/2022).      Face to Face Video Visit:     MOISE David.

## 2022-10-03 ENCOUNTER — TELEMEDICINE (OUTPATIENT)
Dept: MEDICAL GROUP | Facility: MEDICAL CENTER | Age: 57
End: 2022-10-03
Payer: COMMERCIAL

## 2022-10-03 VITALS
SYSTOLIC BLOOD PRESSURE: 117 MMHG | TEMPERATURE: 99.5 F | HEART RATE: 98 BPM | BODY MASS INDEX: 36.92 KG/M2 | DIASTOLIC BLOOD PRESSURE: 86 MMHG | WEIGHT: 250 LBS

## 2022-10-03 DIAGNOSIS — E78.5 DYSLIPIDEMIA: ICD-10-CM

## 2022-10-03 DIAGNOSIS — Z12.11 SCREEN FOR COLON CANCER: ICD-10-CM

## 2022-10-03 DIAGNOSIS — E66.01 SEVERE OBESITY (BMI 35.0-39.9) WITH COMORBIDITY (HCC): ICD-10-CM

## 2022-10-03 DIAGNOSIS — R73.03 PREDIABETES: ICD-10-CM

## 2022-10-03 PROCEDURE — 99214 OFFICE O/P EST MOD 30 MIN: CPT | Mod: 95 | Performed by: NURSE PRACTITIONER

## 2022-10-03 RX ORDER — TIRZEPATIDE 5 MG/.5ML
0.5 INJECTION, SOLUTION SUBCUTANEOUS
Qty: 2 ML | Refills: 1 | Status: SHIPPED | OUTPATIENT
Start: 2022-10-03 | End: 2022-12-14 | Stop reason: SDUPTHER

## 2022-10-03 RX ORDER — TIRZEPATIDE 2.5 MG/.5ML
0.5 INJECTION, SOLUTION SUBCUTANEOUS
Qty: 2 ML | Refills: 0 | Status: SHIPPED | OUTPATIENT
Start: 2022-10-03 | End: 2022-12-08

## 2022-10-03 NOTE — PROGRESS NOTES
Telemedicine Video Visit: Established Patient   This Remote Face to Face encounter was conducted via Zoom. Given the importance of social distancing and other strategies recommended to reduce the risk of COVID-19 transmission, I am providing medical care to this patient via audio/video visit in place of an in person visit at the request of the patient. Verbal consent to telehealth, risks, benefits, and consequences were discussed. Patient retains the right to withdraw at any time. All existing confidentiality protections apply. The patient has access to all transmitted medical information. No dissemination of any patient images or information to other entities without further written consent.  Subjective:     Chief Complaint   Patient presents with    Weight Loss     Initial Counseling     Shana Dunn is a 57 y.o. female presenting for evaluation and management of:    Just got back from visiting her mother in [place].  Patient is interested in starting on a medication to help her with her blood sugars, cholesterol as well as her weight.  Has done some research and is interested in starting on Mounjaro.  Has not been on any medications in the past.  Denies any family history of medullary thyroid cancer, multiple endocrine neoplasia, pancreatitis.  Currently not on any medications that potentially increase her risk of hypoglycemia.  She does have dyslipidemia, obesity with a BMI greater than 35, A1c increasing.    ROS   Denies any recent fevers or chills. No nausea or vomiting. No chest pains or shortness of breath.     Allergies   Allergen Reactions    Penicillins Anaphylaxis    Zithromax [Azithromycin] Hives and Swelling     Neck and tongue    Latex Rash    Crestor  [Rosuvastatin Calcium] Rash    Crestor [Rosuvastatin Calcium] Hives    Sulfa Drugs Anaphylaxis    Wheat Bran      Gastric distress and skin in mouth sloughs off       Current medicines (including changes today)  Current Outpatient Medications    Medication Sig Dispense Refill    Tirzepatide (MOUNJARO) 2.5 MG/0.5ML Solution Pen-injector Inject 0.5 mL under the skin every 7 days. For 30 days 2 mL 0    Tirzepatide (MOUNJARO) 5 MG/0.5ML Solution Pen-injector Inject 0.5 mL under the skin every 7 days. 2 mL 1    ALPRAZolam (XANAX) 0.25 MG Tab       folic acid (FOLVITE) 1 MG Tab       folic acid (FOLVITE) 1 MG Tab Take 1 Tablet by mouth every day. 90 Tablet 3    hydroCHLOROthiazide (HYDRODIURIL) 25 MG Tab TAKE 1 TABLET BY MOUTH EVERY DAY 90 Tablet 2    lisinopril (PRINIVIL) 5 MG Tab TAKE 1 TABLET BY MOUTH EVERY DAY 90 Tablet 2    atorvastatin (LIPITOR) 10 MG Tab TAKE 1 TABLET BY MOUTH EVERY DAY 90 Tablet 2    lansoprazole (PREVACID) 30 MG CAPSULE DELAYED RELEASE TAKE 1 CAPSULE BY MOUTH EVERY DAY 90 Capsule 2    citalopram (CELEXA) 40 MG Tab Take 1 Tablet by mouth every day. 90 Tablet 3    celecoxib (CELEBREX) 200 MG Cap Take 200 mg by mouth every day.      loratadine (CLARITIN) 10 MG Tab Take 1 Tab by mouth every day. 30 Tab     Cholecalciferol (VITAMIN D3) 5000 UNITS Cap Take 1 Cap by mouth every day.      BIOTIN PO Take 1,000 Units by mouth every day.       No current facility-administered medications for this visit.       Patient Active Problem List    Diagnosis Date Noted    Obesity (BMI 30-39.9) 07/07/2022    Hepatic steatosis 08/25/2020    Elevated ferritin 08/17/2020    Folic acid deficiency 08/17/2020    Osteoarthritis of knee 05/28/2019    Multiple thyroid nodules 04/25/2019    Family history of hemochromatosis 04/25/2019    ASHLY (generalized anxiety disorder) 07/20/2018    Elevated uric acid in blood 07/20/2018    Dyslipidemia 06/20/2018    Gastroesophageal reflux disease 06/20/2018    Arthralgia 06/20/2018    Pre-diabetes 06/20/2018    Lichen sclerosus 06/20/2018       Family History   Problem Relation Age of Onset    Heart Failure Mother     Cancer Father         prostate    Other Sister         MS    Cancer Maternal Grandmother         Leukemia        She  has a past medical history of Arthritis, Complex tear of medial meniscus of left knee as current injury (3/6/2017), Heart burn, High cholesterol, Hypertension, Multiple thyroid nodules, Pain (2/23/17), Pneumonia (1975), and Psychiatric problem.  She  has a past surgical history that includes knee arthroscopy (Left, 5/18/16); hysterectomy laparoscopy (02/2014); tommy by laparoscopy (2001); tubal ligation (Bilateral, 1999); colonoscopy (2015); [id number] (Bilateral, 1982); knee arthroscopy (Right, 3/6/2017); and meniscectomy, knee, medial (Right, 3/6/2017).       Objective:   Vitals obtained by patient:  /86   Pulse 98   Temp 37.5 °C (99.5 °F) (Temporal)   Wt 113 kg (250 lb)   BMI 36.92 kg/m²     Physical Exam:  Constitutional: Alert, no distress, well-groomed.  Skin: No rashes in visible areas.  Eye: Round. Conjunctiva clear, lids normal. No icterus.   ENMT: Lips pink without lesions, good dentition, moist mucous membranes. Phonation normal.  Neck: No masses, no thyromegaly. Moves freely without pain.  CV: Pulse as reported by patient  Respiratory: Unlabored respiratory effort, no cough or audible wheeze  Psych: Alert and oriented x3, normal affect and mood.       Assessment and Plan:   The following treatment plan was discussed:     1. Prediabetes  A1c remains elevated.  Patient would like to start on Mounjaro.  Denies any family history of multiple endocrine neoplasia, medullary thyroid cancer, history of pancreatitis.  Currently not on any other medications that may cause hypoglycemia such as sulfonylurea.  Given patient's comorbidities, BMI over 35, dyslipidemia agree that it is beneficial for patient to start on the medication to help reduce further progression.  She was set up on 2.5 mg weekly then increase after 4 weeks to 5 mg weekly and follow-up in the clinic.May increase dose in 2.5 mg/week increments every 4 weeks if needed to achieve glycemic goals (maximum weekly dose: 15 mg/week).    - Tirzepatide (MOUNJARO) 2.5 MG/0.5ML Solution Pen-injector; Inject 0.5 mL under the skin every 7 days. For 30 days  Dispense: 2 mL; Refill: 0  - Tirzepatide (MOUNJARO) 5 MG/0.5ML Solution Pen-injector; Inject 0.5 mL under the skin every 7 days.  Dispense: 2 mL; Refill: 1    2. Dyslipidemia  - Tirzepatide (MOUNJARO) 2.5 MG/0.5ML Solution Pen-injector; Inject 0.5 mL under the skin every 7 days. For 30 days  Dispense: 2 mL; Refill: 0  - Tirzepatide (MOUNJARO) 5 MG/0.5ML Solution Pen-injector; Inject 0.5 mL under the skin every 7 days.  Dispense: 2 mL; Refill: 1    3. Severe obesity (BMI 35.0-39.9) with comorbidity (HCC)  - Tirzepatide (MOUNJARO) 2.5 MG/0.5ML Solution Pen-injector; Inject 0.5 mL under the skin every 7 days. For 30 days  Dispense: 2 mL; Refill: 0  - Tirzepatide (MOUNJARO) 5 MG/0.5ML Solution Pen-injector; Inject 0.5 mL under the skin every 7 days.  Dispense: 2 mL; Refill: 1    4. Screen for colon cancer  - Referral to GI for Colonoscopy        Follow-up: No follow-ups on file.    Face to Face Video Visit:     EMIR David

## 2022-10-25 DIAGNOSIS — F41.0 PANIC ATTACKS: ICD-10-CM

## 2022-10-25 DIAGNOSIS — F41.1 GENERALIZED ANXIETY DISORDER: ICD-10-CM

## 2022-10-26 RX ORDER — CITALOPRAM 40 MG/1
40 TABLET ORAL
Qty: 90 TABLET | Refills: 3 | Status: SHIPPED | OUTPATIENT
Start: 2022-10-26 | End: 2023-06-13 | Stop reason: SDUPTHER

## 2022-11-14 ENCOUNTER — HOSPITAL ENCOUNTER (OUTPATIENT)
Dept: RADIOLOGY | Facility: MEDICAL CENTER | Age: 57
End: 2022-11-14
Attending: STUDENT IN AN ORGANIZED HEALTH CARE EDUCATION/TRAINING PROGRAM
Payer: COMMERCIAL

## 2022-11-14 DIAGNOSIS — M47.816 LUMBAR SPONDYLOSIS: ICD-10-CM

## 2022-11-14 DIAGNOSIS — M48.062 PSEUDOCLAUDICATION SYNDROME: ICD-10-CM

## 2022-11-14 DIAGNOSIS — M54.16 LUMBAR RADICULOPATHY: ICD-10-CM

## 2022-11-14 PROCEDURE — 72148 MRI LUMBAR SPINE W/O DYE: CPT

## 2022-11-22 DIAGNOSIS — F41.0 PANIC ATTACKS: ICD-10-CM

## 2022-11-27 RX ORDER — ALPRAZOLAM 0.25 MG/1
TABLET ORAL
Qty: 30 TABLET | Refills: 1 | Status: SHIPPED | OUTPATIENT
Start: 2022-11-27 | End: 2024-02-01 | Stop reason: SDUPTHER

## 2022-12-08 ENCOUNTER — OFFICE VISIT (OUTPATIENT)
Dept: MEDICAL GROUP | Facility: MEDICAL CENTER | Age: 57
End: 2022-12-08
Payer: COMMERCIAL

## 2022-12-08 VITALS
DIASTOLIC BLOOD PRESSURE: 72 MMHG | WEIGHT: 238.98 LBS | HEIGHT: 69 IN | SYSTOLIC BLOOD PRESSURE: 108 MMHG | TEMPERATURE: 97.3 F | HEART RATE: 82 BPM | OXYGEN SATURATION: 95 % | BODY MASS INDEX: 35.4 KG/M2

## 2022-12-08 DIAGNOSIS — E66.01 SEVERE OBESITY (BMI 35.0-39.9) WITH COMORBIDITY (HCC): ICD-10-CM

## 2022-12-08 DIAGNOSIS — M71.38 SYNOVIAL CYST OF LUMBAR FACET JOINT: ICD-10-CM

## 2022-12-08 DIAGNOSIS — R73.03 PREDIABETES: ICD-10-CM

## 2022-12-08 DIAGNOSIS — M47.819 DEGENERATIVE ARTHROPATHY OF SPINAL FACET JOINT: ICD-10-CM

## 2022-12-08 DIAGNOSIS — M48.061 SPINAL STENOSIS OF LUMBAR REGION WITHOUT NEUROGENIC CLAUDICATION: ICD-10-CM

## 2022-12-08 PROBLEM — E66.9 OBESITY (BMI 30-39.9): Chronic | Status: RESOLVED | Noted: 2022-07-07 | Resolved: 2022-12-08

## 2022-12-08 PROCEDURE — 99214 OFFICE O/P EST MOD 30 MIN: CPT | Performed by: NURSE PRACTITIONER

## 2022-12-08 RX ORDER — TIRZEPATIDE 7.5 MG/.5ML
7.5 INJECTION, SOLUTION SUBCUTANEOUS
Qty: 2 ML | Refills: 1 | Status: SHIPPED | OUTPATIENT
Start: 2022-12-08 | End: 2023-01-27 | Stop reason: SDUPTHER

## 2022-12-08 RX ORDER — CLINDAMYCIN PHOSPHATE, BENZOYL PEROXIDE 25; 10 MG/G; MG/G
GEL TOPICAL
Status: ON HOLD | COMMUNITY
Start: 2022-11-03 | End: 2023-07-20

## 2022-12-08 NOTE — PROGRESS NOTES
Chief Complaint   Patient presents with    Weight Check     Fv       Subjective:     HPI:     Shana Dunn is a 57 y.o. female   here to discuss the evaluation and management of:     Severe obesity (BMI 35.0-39.9) with comorbidity (HCC)  Follow up Mounjaro. Feeling better. Has lost significant amount of weight.   Stomach feels better, head feels better.   Mild nausea. No vomiting.   Starting weight 254.4lb-early October.  Home weight 234lb today. (Office weight 238lb).     Back pain  Followed by Dr. Flores  Had a MRI-large cyst-causing pain.   Pending referral to Dr. Cook for possible intervention.  Having some limited mobility.  Had facet injections last week    MRI 11/14/2022    IMPRESSION:      Grade 1 anterolisthesis of L4 over L5. Bilateral severe facet degenerative changes noted with facet effusions with facet origin synovial cyst projecting into the posterior soft tissues.     There is a large right facet origin subligamentous synovial cyst that impinges upon the spinal canal and causes moderate canal stenosis with cauda equina compression at the L4-5 level. This cyst would be amenable to percutaneous CT-guided disruption by   interventional neuroradiology as an alternative to lumbar surgery. Consider referral to interventional neuroradiology.    ROS: : see above      Current Outpatient Medications:     Tirzepatide (MOUNJARO) 7.5 MG/0.5ML Solution Pen-injector, Inject 7.5 mg under the skin every 7 days., Disp: 2 mL, Rfl: 1    ALPRAZolam (XANAX) 0.25 MG Tab, TAKE 1 TABLET BY MOUTH AT BEDTIME AS NEEDED FOR SLEEP OR ANXIETY FOR UP TO 30 DAYS., Disp: 30 Tablet, Rfl: 1    citalopram (CELEXA) 40 MG Tab, TAKE 1 TABLET BY MOUTH EVERY DAY, Disp: 90 Tablet, Rfl: 3    folic acid (FOLVITE) 1 MG Tab, Take 1 Tablet by mouth every day., Disp: 90 Tablet, Rfl: 3    hydroCHLOROthiazide (HYDRODIURIL) 25 MG Tab, TAKE 1 TABLET BY MOUTH EVERY DAY, Disp: 90 Tablet, Rfl: 2    lisinopril (PRINIVIL) 5 MG Tab, TAKE 1 TABLET BY  MOUTH EVERY DAY, Disp: 90 Tablet, Rfl: 2    lansoprazole (PREVACID) 30 MG CAPSULE DELAYED RELEASE, TAKE 1 CAPSULE BY MOUTH EVERY DAY, Disp: 90 Capsule, Rfl: 2    celecoxib (CELEBREX) 200 MG Cap, Take 200 mg by mouth every day., Disp: , Rfl:     loratadine (CLARITIN) 10 MG Tab, Take 1 Tab by mouth every day., Disp: 30 Tab, Rfl:     Cholecalciferol (VITAMIN D3) 5000 UNITS Cap, Take 1 Cap by mouth every day., Disp: , Rfl:     BIOTIN PO, Take 1,000 Units by mouth every day., Disp: , Rfl:     Clindamycin Phos-Benzoyl Perox 1.2-2.5 % Gel, APPLY TO ACNE TO SPOT TREAT IN AM AS NEEDED, Disp: , Rfl:     Tirzepatide (MOUNJARO) 5 MG/0.5ML Solution Pen-injector, Inject 0.5 mL under the skin every 7 days., Disp: 2 mL, Rfl: 1    Allergies   Allergen Reactions    Penicillins Anaphylaxis    Zithromax [Azithromycin] Hives and Swelling     Neck and tongue    Latex Rash    Crestor [Rosuvastatin Calcium] Hives    Sulfa Drugs Anaphylaxis    Wheat Bran      Gastric distress and skin in mouth sloughs off       Past Medical History:   Diagnosis Date    Arthritis     bilateral knee    Complex tear of medial meniscus of left knee as current injury 3/6/2017    Heart burn     chronic    High cholesterol     Hypertension     Multiple thyroid nodules     Pain 2/23/17    right knee    Pneumonia 1975    Psychiatric problem     anxiety     Past Surgical History:   Procedure Laterality Date    KNEE ARTHROSCOPY Right 3/6/2017    Procedure: KNEE ARTHROSCOPY;  Surgeon: Mati Wagoner M.D.;  Location: SURGERY [facility];  Service:     MENISCECTOMY, KNEE, MEDIAL Right 3/6/2017    Procedure: MEDIAL MENISCECTOMY - PARTIAL;  Surgeon: Mati Wagoner M.D.;  Location: SURGERY [facility];  Service:     KNEE ARTHROSCOPY Left 5/18/16    COLONOSCOPY  2015    HYSTERECTOMY LAPAROSCOPY  02/2014    ovaries spared    CAITY BY LAPAROSCOPY  2001    TUBAL LIGATION Bilateral 1999    [id number] Bilateral 1982     Family History   Problem Relation Age of Onset     "Heart Failure Mother     Cancer Father         prostate    Other Sister         MS    Cancer Maternal Grandmother         Leukemia     Social History     Socioeconomic History    Marital status:      Spouse name: Not on file    Number of children: Not on file    Years of education: Not on file    Highest education level: Not on file   Occupational History    Not on file   Tobacco Use    Smoking status: Former     Packs/day: 0.50     Years: 20.00     Pack years: 10.00     Types: Cigarettes     Quit date: 2008     Years since quittin.8    Smokeless tobacco: Never   Vaping Use    Vaping Use: Never used   Substance and Sexual Activity    Alcohol use: Yes     Comment: 2 glasses per week    Drug use: No    Sexual activity: Yes     Partners: Male     Comment:    Other Topics Concern    Not on file   Social History Narrative    Not on file     Social Determinants of Health     Financial Resource Strain: Not on file   Food Insecurity: Not on file   Transportation Needs: Not on file   Physical Activity: Not on file   Stress: Not on file   Social Connections: Not on file   Intimate Partner Violence: Not on file   Housing Stability: Not on file       Objective:     Vitals: /72 (BP Location: Left arm, Patient Position: Sitting, BP Cuff Size: Large adult)   Pulse 82   Temp 36.3 °C (97.3 °F) (Temporal)   Ht 1.753 m (5' 9\")   Wt 108 kg (238 lb 15.7 oz)   SpO2 95%   BMI 35.29 kg/m²    General: Alert, pleasant, NAD  HEENT: Normocephalic.  Neck supple.   Respiratory: no distress, no audible wheezing, RR -WNL  Skin: Warm, dry, no rashes.  Extremities: No leg edema. No discoloration  Neurological: No tremors  Psych:  Affect/mood is normal, judgement is good, memory is intact, grooming is appropriate.    Assessment/Plan:     Shana was seen today for weight check.    Diagnoses and all orders for this visit:    Severe obesity (BMI 35.0-39.9) with comorbidity (HCC)  Prediabetes  Chronic.  Has lost 20 " pounds since starting on  Mounjaro.  Very mild side effects.  Continues to try to make lifestyle modifications.  At this time we will increase to 7.5 mg weekly although there has been a national shortage of this dose.  If this is unavailable from her pharmacy she will message me and I will send her the 5 mg again and she will continue this until the 7.5 mg are available.  Follow-up 3 months.  -     Tirzepatide (MOUNJARO) 7.5 MG/0.5ML Solution Pen-injector; Inject 7.5 mg under the skin every 7 days.    Spinal stenosis of lumbar region without neurogenic claudication  Degenerative arthropathy of spinal facet joint  Synovial cyst of lumbar facet joint  Chronic back pain.  She does find some relief from the facet joint injections.  Based on recent MRI findings of facet joint synovial there is discussion about possible intervention to help relieve pressure as there is moderate cauda equina compression.  Patient denies any red flags at this time.  Continue to follow-up with Dr. Flores.    Return in about 3 months (around 3/8/2023).          Radha FIELD

## 2022-12-09 RX ORDER — LANSOPRAZOLE 30 MG/1
CAPSULE, DELAYED RELEASE ORAL
Qty: 90 CAPSULE | Refills: 2 | Status: SHIPPED | OUTPATIENT
Start: 2022-12-09 | End: 2023-06-10

## 2023-01-29 DIAGNOSIS — R73.03 PREDIABETES: ICD-10-CM

## 2023-01-29 DIAGNOSIS — E66.01 SEVERE OBESITY (BMI 35.0-39.9) WITH COMORBIDITY (HCC): ICD-10-CM

## 2023-01-29 DIAGNOSIS — E78.5 DYSLIPIDEMIA: ICD-10-CM

## 2023-01-30 RX ORDER — TIRZEPATIDE 7.5 MG/.5ML
7.5 INJECTION, SOLUTION SUBCUTANEOUS
Qty: 2 ML | Refills: 1 | Status: SHIPPED | OUTPATIENT
Start: 2023-01-30 | End: 2023-03-30 | Stop reason: SDUPTHER

## 2023-02-02 RX ORDER — TIRZEPATIDE 5 MG/.5ML
0.5 INJECTION, SOLUTION SUBCUTANEOUS
Refills: 3 | OUTPATIENT
Start: 2023-02-02

## 2023-02-23 DIAGNOSIS — E66.01 SEVERE OBESITY (BMI 35.0-39.9) WITH COMORBIDITY (HCC): ICD-10-CM

## 2023-02-23 DIAGNOSIS — E78.5 DYSLIPIDEMIA: ICD-10-CM

## 2023-02-23 DIAGNOSIS — R73.03 PREDIABETES: ICD-10-CM

## 2023-03-29 ENCOUNTER — PATIENT MESSAGE (OUTPATIENT)
Dept: MEDICAL GROUP | Facility: MEDICAL CENTER | Age: 58
End: 2023-03-29
Payer: COMMERCIAL

## 2023-03-31 RX ORDER — TIRZEPATIDE 7.5 MG/.5ML
7.5 INJECTION, SOLUTION SUBCUTANEOUS
Qty: 2 ML | Refills: 1 | Status: SHIPPED | OUTPATIENT
Start: 2023-03-31 | End: 2023-05-05

## 2023-04-05 ENCOUNTER — PATIENT MESSAGE (OUTPATIENT)
Dept: MEDICAL GROUP | Facility: MEDICAL CENTER | Age: 58
End: 2023-04-05
Payer: COMMERCIAL

## 2023-04-05 DIAGNOSIS — E78.5 DYSLIPIDEMIA: ICD-10-CM

## 2023-04-05 DIAGNOSIS — R73.03 PREDIABETES: ICD-10-CM

## 2023-04-05 DIAGNOSIS — E66.01 SEVERE OBESITY (BMI 35.0-39.9) WITH COMORBIDITY (HCC): ICD-10-CM

## 2023-04-07 ENCOUNTER — PHARMACY VISIT (OUTPATIENT)
Dept: PHARMACY | Facility: MEDICAL CENTER | Age: 58
End: 2023-04-07
Payer: COMMERCIAL

## 2023-04-07 PROCEDURE — RXMED WILLOW AMBULATORY MEDICATION CHARGE: Performed by: NURSE PRACTITIONER

## 2023-04-19 RX ORDER — HYDROCHLOROTHIAZIDE 25 MG/1
TABLET ORAL
Qty: 90 TABLET | Refills: 2 | Status: SHIPPED | OUTPATIENT
Start: 2023-04-19 | End: 2023-06-13 | Stop reason: SDUPTHER

## 2023-04-19 RX ORDER — LISINOPRIL 5 MG/1
TABLET ORAL
Qty: 90 TABLET | Refills: 2 | Status: SHIPPED | OUTPATIENT
Start: 2023-04-19 | End: 2023-06-13 | Stop reason: SDUPTHER

## 2023-04-28 DIAGNOSIS — B02.9 HERPES ZOSTER WITHOUT COMPLICATION: ICD-10-CM

## 2023-04-28 RX ORDER — VALACYCLOVIR HYDROCHLORIDE 1 G/1
1000 TABLET, FILM COATED ORAL 3 TIMES DAILY
Qty: 30 TABLET | Refills: 2 | Status: SHIPPED | OUTPATIENT
Start: 2023-04-28 | End: 2023-05-08

## 2023-05-05 RX ORDER — TIRZEPATIDE 7.5 MG/.5ML
7.5 INJECTION, SOLUTION SUBCUTANEOUS
Qty: 2 ML | Refills: 1 | Status: SHIPPED | OUTPATIENT
Start: 2023-05-05 | End: 2023-06-30 | Stop reason: SDUPTHER

## 2023-06-10 RX ORDER — LANSOPRAZOLE 30 MG/1
CAPSULE, DELAYED RELEASE ORAL
Qty: 90 CAPSULE | Refills: 2 | Status: SHIPPED | OUTPATIENT
Start: 2023-06-10 | End: 2023-06-13 | Stop reason: SDUPTHER

## 2023-06-13 DIAGNOSIS — F41.1 GENERALIZED ANXIETY DISORDER: ICD-10-CM

## 2023-06-13 DIAGNOSIS — E53.8 FOLIC ACID DEFICIENCY: ICD-10-CM

## 2023-06-13 DIAGNOSIS — F41.0 PANIC ATTACKS: ICD-10-CM

## 2023-06-13 RX ORDER — LISINOPRIL 5 MG/1
5 TABLET ORAL
Qty: 90 TABLET | Refills: 2 | Status: SHIPPED | OUTPATIENT
Start: 2023-06-13

## 2023-06-13 RX ORDER — HYDROCHLOROTHIAZIDE 25 MG/1
25 TABLET ORAL
Qty: 90 TABLET | Refills: 2 | Status: SHIPPED | OUTPATIENT
Start: 2023-06-13

## 2023-06-13 RX ORDER — FOLIC ACID 1 MG/1
1 TABLET ORAL DAILY
Qty: 90 TABLET | Refills: 3 | Status: ON HOLD | OUTPATIENT
Start: 2023-06-13 | End: 2023-07-20

## 2023-06-13 RX ORDER — CITALOPRAM 40 MG/1
40 TABLET ORAL
Qty: 90 TABLET | Refills: 3 | Status: SHIPPED | OUTPATIENT
Start: 2023-06-13 | End: 2024-01-04

## 2023-06-13 RX ORDER — LANSOPRAZOLE 30 MG/1
30 CAPSULE, DELAYED RELEASE ORAL
Qty: 90 CAPSULE | Refills: 2 | Status: SHIPPED | OUTPATIENT
Start: 2023-06-13

## 2023-06-19 ENCOUNTER — APPOINTMENT (OUTPATIENT)
Dept: ADMISSIONS | Facility: MEDICAL CENTER | Age: 58
End: 2023-06-19
Attending: NEUROLOGICAL SURGERY
Payer: COMMERCIAL

## 2023-06-22 ENCOUNTER — PRE-ADMISSION TESTING (OUTPATIENT)
Dept: ADMISSIONS | Facility: MEDICAL CENTER | Age: 58
End: 2023-06-22
Attending: NEUROLOGICAL SURGERY
Payer: COMMERCIAL

## 2023-06-22 DIAGNOSIS — Z01.810 PRE-OPERATIVE CARDIOVASCULAR EXAMINATION: ICD-10-CM

## 2023-06-22 DIAGNOSIS — Z01.812 PRE-OPERATIVE LABORATORY EXAMINATION: ICD-10-CM

## 2023-06-22 DIAGNOSIS — Z01.811 PRE-OPERATIVE RESPIRATORY EXAMINATION: ICD-10-CM

## 2023-06-22 RX ORDER — ALPRAZOLAM 0.25 MG/1
0.25 TABLET ORAL
COMMUNITY
End: 2023-10-19 | Stop reason: SDUPTHER

## 2023-07-03 RX ORDER — TIRZEPATIDE 7.5 MG/.5ML
7.5 INJECTION, SOLUTION SUBCUTANEOUS
Qty: 2 ML | Refills: 1 | Status: SHIPPED | OUTPATIENT
Start: 2023-07-03 | End: 2023-10-11 | Stop reason: SDUPTHER

## 2023-07-06 ENCOUNTER — PRE-ADMISSION TESTING (OUTPATIENT)
Dept: ADMISSIONS | Facility: MEDICAL CENTER | Age: 58
End: 2023-07-06
Attending: NEUROLOGICAL SURGERY
Payer: COMMERCIAL

## 2023-07-06 ENCOUNTER — HOSPITAL ENCOUNTER (OUTPATIENT)
Dept: RADIOLOGY | Facility: MEDICAL CENTER | Age: 58
End: 2023-07-06
Attending: NEUROLOGICAL SURGERY
Payer: COMMERCIAL

## 2023-07-06 DIAGNOSIS — Z01.811 PRE-OPERATIVE RESPIRATORY EXAMINATION: ICD-10-CM

## 2023-07-06 DIAGNOSIS — Z01.810 PRE-OPERATIVE CARDIOVASCULAR EXAMINATION: ICD-10-CM

## 2023-07-06 DIAGNOSIS — Z01.812 PRE-OPERATIVE LABORATORY EXAMINATION: ICD-10-CM

## 2023-07-06 LAB
ALBUMIN SERPL BCP-MCNC: 4.6 G/DL (ref 3.2–4.9)
ALBUMIN/GLOB SERPL: 2.1 G/DL
ALP SERPL-CCNC: 92 U/L (ref 30–99)
ALT SERPL-CCNC: 11 U/L (ref 2–50)
ANION GAP SERPL CALC-SCNC: 12 MMOL/L (ref 7–16)
APTT PPP: 31.7 SEC (ref 24.7–36)
AST SERPL-CCNC: 7 U/L (ref 12–45)
BASOPHILS # BLD AUTO: 0.5 % (ref 0–1.8)
BASOPHILS # BLD: 0.06 K/UL (ref 0–0.12)
BILIRUB SERPL-MCNC: 0.6 MG/DL (ref 0.1–1.5)
BUN SERPL-MCNC: 13 MG/DL (ref 8–22)
CALCIUM ALBUM COR SERPL-MCNC: 9 MG/DL (ref 8.5–10.5)
CALCIUM SERPL-MCNC: 9.5 MG/DL (ref 8.5–10.5)
CHLORIDE SERPL-SCNC: 99 MMOL/L (ref 96–112)
CO2 SERPL-SCNC: 26 MMOL/L (ref 20–33)
CREAT SERPL-MCNC: 0.77 MG/DL (ref 0.5–1.4)
EKG IMPRESSION: NORMAL
EOSINOPHIL # BLD AUTO: 0.16 K/UL (ref 0–0.51)
EOSINOPHIL NFR BLD: 1.3 % (ref 0–6.9)
ERYTHROCYTE [DISTWIDTH] IN BLOOD BY AUTOMATED COUNT: 43.1 FL (ref 35.9–50)
EST. AVERAGE GLUCOSE BLD GHB EST-MCNC: 94 MG/DL
GFR SERPLBLD CREATININE-BSD FMLA CKD-EPI: 89 ML/MIN/1.73 M 2
GLOBULIN SER CALC-MCNC: 2.2 G/DL (ref 1.9–3.5)
GLUCOSE SERPL-MCNC: 94 MG/DL (ref 65–99)
HBA1C MFR BLD: 4.9 % (ref 4–5.6)
HCT VFR BLD AUTO: 41.2 % (ref 37–47)
HGB BLD-MCNC: 14 G/DL (ref 12–16)
IMM GRANULOCYTES # BLD AUTO: 0.06 K/UL (ref 0–0.11)
IMM GRANULOCYTES NFR BLD AUTO: 0.5 % (ref 0–0.9)
INR PPP: 1.01 (ref 0.87–1.13)
LYMPHOCYTES # BLD AUTO: 3.8 K/UL (ref 1–4.8)
LYMPHOCYTES NFR BLD: 30.5 % (ref 22–41)
MCH RBC QN AUTO: 29.4 PG (ref 27–33)
MCHC RBC AUTO-ENTMCNC: 34 G/DL (ref 32.2–35.5)
MCV RBC AUTO: 86.6 FL (ref 81.4–97.8)
MONOCYTES # BLD AUTO: 0.55 K/UL (ref 0–0.85)
MONOCYTES NFR BLD AUTO: 4.4 % (ref 0–13.4)
NEUTROPHILS # BLD AUTO: 7.83 K/UL (ref 1.82–7.42)
NEUTROPHILS NFR BLD: 62.8 % (ref 44–72)
NRBC # BLD AUTO: 0 K/UL
NRBC BLD-RTO: 0 /100 WBC (ref 0–0.2)
PLATELET # BLD AUTO: 296 K/UL (ref 164–446)
PMV BLD AUTO: 10.8 FL (ref 9–12.9)
POTASSIUM SERPL-SCNC: 3.6 MMOL/L (ref 3.6–5.5)
PROT SERPL-MCNC: 6.8 G/DL (ref 6–8.2)
PROTHROMBIN TIME: 13.2 SEC (ref 12–14.6)
RBC # BLD AUTO: 4.76 M/UL (ref 4.2–5.4)
SODIUM SERPL-SCNC: 137 MMOL/L (ref 135–145)
WBC # BLD AUTO: 12.5 K/UL (ref 4.8–10.8)

## 2023-07-06 PROCEDURE — 85730 THROMBOPLASTIN TIME PARTIAL: CPT

## 2023-07-06 PROCEDURE — 83036 HEMOGLOBIN GLYCOSYLATED A1C: CPT

## 2023-07-06 PROCEDURE — 71046 X-RAY EXAM CHEST 2 VIEWS: CPT

## 2023-07-06 PROCEDURE — 80053 COMPREHEN METABOLIC PANEL: CPT

## 2023-07-06 PROCEDURE — 85025 COMPLETE CBC W/AUTO DIFF WBC: CPT

## 2023-07-06 PROCEDURE — 85610 PROTHROMBIN TIME: CPT

## 2023-07-06 PROCEDURE — 36415 COLL VENOUS BLD VENIPUNCTURE: CPT

## 2023-07-06 PROCEDURE — 93005 ELECTROCARDIOGRAM TRACING: CPT

## 2023-07-06 PROCEDURE — 93010 ELECTROCARDIOGRAM REPORT: CPT | Performed by: INTERNAL MEDICINE

## 2023-07-20 ENCOUNTER — HOSPITAL ENCOUNTER (OUTPATIENT)
Facility: MEDICAL CENTER | Age: 58
End: 2023-07-20
Attending: NEUROLOGICAL SURGERY | Admitting: NEUROLOGICAL SURGERY
Payer: COMMERCIAL

## 2023-07-20 ENCOUNTER — ANESTHESIA EVENT (OUTPATIENT)
Dept: SURGERY | Facility: MEDICAL CENTER | Age: 58
End: 2023-07-20
Payer: COMMERCIAL

## 2023-07-20 ENCOUNTER — ANESTHESIA (OUTPATIENT)
Dept: SURGERY | Facility: MEDICAL CENTER | Age: 58
End: 2023-07-20
Payer: COMMERCIAL

## 2023-07-20 ENCOUNTER — APPOINTMENT (OUTPATIENT)
Dept: RADIOLOGY | Facility: MEDICAL CENTER | Age: 58
End: 2023-07-20
Attending: NEUROLOGICAL SURGERY
Payer: COMMERCIAL

## 2023-07-20 VITALS
DIASTOLIC BLOOD PRESSURE: 57 MMHG | RESPIRATION RATE: 16 BRPM | OXYGEN SATURATION: 95 % | HEIGHT: 68 IN | HEART RATE: 83 BPM | TEMPERATURE: 97.7 F | BODY MASS INDEX: 29.57 KG/M2 | SYSTOLIC BLOOD PRESSURE: 107 MMHG | WEIGHT: 195.11 LBS

## 2023-07-20 LAB
GLUCOSE BLD STRIP.AUTO-MCNC: 103 MG/DL (ref 65–99)
PATHOLOGY CONSULT NOTE: NORMAL

## 2023-07-20 PROCEDURE — 88304 TISSUE EXAM BY PATHOLOGIST: CPT

## 2023-07-20 PROCEDURE — 00630 ANES PX LUMBAR REGION NOS: CPT | Performed by: ANESTHESIOLOGY

## 2023-07-20 PROCEDURE — 110454 HCHG SHELL REV 250: Performed by: NEUROLOGICAL SURGERY

## 2023-07-20 PROCEDURE — 700101 HCHG RX REV CODE 250: Performed by: NEUROLOGICAL SURGERY

## 2023-07-20 PROCEDURE — 72020 X-RAY EXAM OF SPINE 1 VIEW: CPT

## 2023-07-20 PROCEDURE — 160046 HCHG PACU - 1ST 60 MINS PHASE II: Performed by: NEUROLOGICAL SURGERY

## 2023-07-20 PROCEDURE — 160041 HCHG SURGERY MINUTES - EA ADDL 1 MIN LEVEL 4: Performed by: NEUROLOGICAL SURGERY

## 2023-07-20 PROCEDURE — 160029 HCHG SURGERY MINUTES - 1ST 30 MINS LEVEL 4: Performed by: NEUROLOGICAL SURGERY

## 2023-07-20 PROCEDURE — 700111 HCHG RX REV CODE 636 W/ 250 OVERRIDE (IP): Mod: JZ | Performed by: ANESTHESIOLOGY

## 2023-07-20 PROCEDURE — 160002 HCHG RECOVERY MINUTES (STAT): Performed by: NEUROLOGICAL SURGERY

## 2023-07-20 PROCEDURE — 700101 HCHG RX REV CODE 250: Performed by: ANESTHESIOLOGY

## 2023-07-20 PROCEDURE — 700111 HCHG RX REV CODE 636 W/ 250 OVERRIDE (IP): Mod: JZ | Performed by: NEUROLOGICAL SURGERY

## 2023-07-20 PROCEDURE — 160035 HCHG PACU - 1ST 60 MINS PHASE I: Performed by: NEUROLOGICAL SURGERY

## 2023-07-20 PROCEDURE — 160048 HCHG OR STATISTICAL LEVEL 1-5: Performed by: NEUROLOGICAL SURGERY

## 2023-07-20 PROCEDURE — 700105 HCHG RX REV CODE 258: Mod: JZ | Performed by: NEUROLOGICAL SURGERY

## 2023-07-20 PROCEDURE — 160009 HCHG ANES TIME/MIN: Performed by: NEUROLOGICAL SURGERY

## 2023-07-20 PROCEDURE — 110371 HCHG SHELL REV 272: Performed by: NEUROLOGICAL SURGERY

## 2023-07-20 PROCEDURE — 82962 GLUCOSE BLOOD TEST: CPT

## 2023-07-20 PROCEDURE — 160025 RECOVERY II MINUTES (STATS): Performed by: NEUROLOGICAL SURGERY

## 2023-07-20 RX ORDER — DEXAMETHASONE SODIUM PHOSPHATE 4 MG/ML
INJECTION, SOLUTION INTRA-ARTICULAR; INTRALESIONAL; INTRAMUSCULAR; INTRAVENOUS; SOFT TISSUE PRN
Status: DISCONTINUED | OUTPATIENT
Start: 2023-07-20 | End: 2023-07-20 | Stop reason: SURG

## 2023-07-20 RX ORDER — HYDROMORPHONE HYDROCHLORIDE 1 MG/ML
0.4 INJECTION, SOLUTION INTRAMUSCULAR; INTRAVENOUS; SUBCUTANEOUS
Status: DISCONTINUED | OUTPATIENT
Start: 2023-07-20 | End: 2023-07-20 | Stop reason: HOSPADM

## 2023-07-20 RX ORDER — MIDAZOLAM HYDROCHLORIDE 1 MG/ML
1 INJECTION INTRAMUSCULAR; INTRAVENOUS
Status: DISCONTINUED | OUTPATIENT
Start: 2023-07-20 | End: 2023-07-20 | Stop reason: HOSPADM

## 2023-07-20 RX ORDER — MEPERIDINE HYDROCHLORIDE 25 MG/ML
25 INJECTION INTRAMUSCULAR; INTRAVENOUS; SUBCUTANEOUS
Status: DISCONTINUED | OUTPATIENT
Start: 2023-07-20 | End: 2023-07-20 | Stop reason: HOSPADM

## 2023-07-20 RX ORDER — ONDANSETRON 2 MG/ML
4 INJECTION INTRAMUSCULAR; INTRAVENOUS
Status: DISCONTINUED | OUTPATIENT
Start: 2023-07-20 | End: 2023-07-20 | Stop reason: HOSPADM

## 2023-07-20 RX ORDER — DIPHENHYDRAMINE HYDROCHLORIDE 50 MG/ML
12.5 INJECTION INTRAMUSCULAR; INTRAVENOUS
Status: DISCONTINUED | OUTPATIENT
Start: 2023-07-20 | End: 2023-07-20 | Stop reason: HOSPADM

## 2023-07-20 RX ORDER — EPHEDRINE SULFATE 50 MG/ML
INJECTION, SOLUTION INTRAVENOUS PRN
Status: DISCONTINUED | OUTPATIENT
Start: 2023-07-20 | End: 2023-07-20 | Stop reason: SURG

## 2023-07-20 RX ORDER — SODIUM CHLORIDE, SODIUM LACTATE, POTASSIUM CHLORIDE, CALCIUM CHLORIDE 600; 310; 30; 20 MG/100ML; MG/100ML; MG/100ML; MG/100ML
INJECTION, SOLUTION INTRAVENOUS CONTINUOUS
Status: DISCONTINUED | OUTPATIENT
Start: 2023-07-20 | End: 2023-07-20 | Stop reason: HOSPADM

## 2023-07-20 RX ORDER — HYDROMORPHONE HYDROCHLORIDE 1 MG/ML
0.1 INJECTION, SOLUTION INTRAMUSCULAR; INTRAVENOUS; SUBCUTANEOUS
Status: DISCONTINUED | OUTPATIENT
Start: 2023-07-20 | End: 2023-07-20 | Stop reason: HOSPADM

## 2023-07-20 RX ORDER — HALOPERIDOL 5 MG/ML
1 INJECTION INTRAMUSCULAR
Status: DISCONTINUED | OUTPATIENT
Start: 2023-07-20 | End: 2023-07-20 | Stop reason: HOSPADM

## 2023-07-20 RX ORDER — CEFAZOLIN SODIUM 1 G/3ML
INJECTION, POWDER, FOR SOLUTION INTRAMUSCULAR; INTRAVENOUS PRN
Status: DISCONTINUED | OUTPATIENT
Start: 2023-07-20 | End: 2023-07-20 | Stop reason: SURG

## 2023-07-20 RX ORDER — HYDROMORPHONE HYDROCHLORIDE 1 MG/ML
0.2 INJECTION, SOLUTION INTRAMUSCULAR; INTRAVENOUS; SUBCUTANEOUS
Status: DISCONTINUED | OUTPATIENT
Start: 2023-07-20 | End: 2023-07-20 | Stop reason: HOSPADM

## 2023-07-20 RX ORDER — ONDANSETRON 2 MG/ML
INJECTION INTRAMUSCULAR; INTRAVENOUS PRN
Status: DISCONTINUED | OUTPATIENT
Start: 2023-07-20 | End: 2023-07-20 | Stop reason: SURG

## 2023-07-20 RX ORDER — KETOROLAC TROMETHAMINE 30 MG/ML
INJECTION, SOLUTION INTRAMUSCULAR; INTRAVENOUS PRN
Status: DISCONTINUED | OUTPATIENT
Start: 2023-07-20 | End: 2023-07-20 | Stop reason: SURG

## 2023-07-20 RX ORDER — OXYCODONE HCL 5 MG/5 ML
10 SOLUTION, ORAL ORAL
Status: DISCONTINUED | OUTPATIENT
Start: 2023-07-20 | End: 2023-07-20 | Stop reason: HOSPADM

## 2023-07-20 RX ORDER — LIDOCAINE HYDROCHLORIDE 20 MG/ML
INJECTION, SOLUTION EPIDURAL; INFILTRATION; INTRACAUDAL; PERINEURAL PRN
Status: DISCONTINUED | OUTPATIENT
Start: 2023-07-20 | End: 2023-07-20 | Stop reason: SURG

## 2023-07-20 RX ORDER — IPRATROPIUM BROMIDE AND ALBUTEROL SULFATE 2.5; .5 MG/3ML; MG/3ML
3 SOLUTION RESPIRATORY (INHALATION)
Status: DISCONTINUED | OUTPATIENT
Start: 2023-07-20 | End: 2023-07-20 | Stop reason: HOSPADM

## 2023-07-20 RX ORDER — HYDROMORPHONE HYDROCHLORIDE 1 MG/ML
0.5 INJECTION, SOLUTION INTRAMUSCULAR; INTRAVENOUS; SUBCUTANEOUS
Status: DISCONTINUED | OUTPATIENT
Start: 2023-07-20 | End: 2023-07-20 | Stop reason: HOSPADM

## 2023-07-20 RX ORDER — CEFAZOLIN SODIUM 1 G/3ML
INJECTION, POWDER, FOR SOLUTION INTRAMUSCULAR; INTRAVENOUS
Status: DISCONTINUED | OUTPATIENT
Start: 2023-07-20 | End: 2023-07-20 | Stop reason: HOSPADM

## 2023-07-20 RX ORDER — OXYCODONE HCL 5 MG/5 ML
5 SOLUTION, ORAL ORAL
Status: DISCONTINUED | OUTPATIENT
Start: 2023-07-20 | End: 2023-07-20 | Stop reason: HOSPADM

## 2023-07-20 RX ORDER — PHENYLEPHRINE HCL IN 0.9% NACL 0.5 MG/5ML
SYRINGE (ML) INTRAVENOUS PRN
Status: DISCONTINUED | OUTPATIENT
Start: 2023-07-20 | End: 2023-07-20 | Stop reason: SURG

## 2023-07-20 RX ORDER — BUPIVACAINE HYDROCHLORIDE AND EPINEPHRINE 5; 5 MG/ML; UG/ML
INJECTION, SOLUTION PERINEURAL
Status: DISCONTINUED | OUTPATIENT
Start: 2023-07-20 | End: 2023-07-20 | Stop reason: HOSPADM

## 2023-07-20 RX ADMIN — ROCURONIUM BROMIDE 50 MG: 10 INJECTION, SOLUTION INTRAVENOUS at 07:35

## 2023-07-20 RX ADMIN — MIDAZOLAM 2 MG: 1 INJECTION, SOLUTION INTRAMUSCULAR; INTRAVENOUS at 07:30

## 2023-07-20 RX ADMIN — PROPOFOL 200 MG: 10 INJECTION, EMULSION INTRAVENOUS at 07:35

## 2023-07-20 RX ADMIN — LIDOCAINE HYDROCHLORIDE 80 MG: 20 INJECTION, SOLUTION EPIDURAL; INFILTRATION; INTRACAUDAL at 07:35

## 2023-07-20 RX ADMIN — KETOROLAC TROMETHAMINE 30 MG: 30 INJECTION, SOLUTION INTRAMUSCULAR; INTRAVENOUS at 08:45

## 2023-07-20 RX ADMIN — SUGAMMADEX 200 MG: 100 INJECTION, SOLUTION INTRAVENOUS at 08:49

## 2023-07-20 RX ADMIN — SODIUM CHLORIDE, POTASSIUM CHLORIDE, SODIUM LACTATE AND CALCIUM CHLORIDE: 600; 310; 30; 20 INJECTION, SOLUTION INTRAVENOUS at 07:19

## 2023-07-20 RX ADMIN — DEXAMETHASONE SODIUM PHOSPHATE 8 MG: 4 INJECTION INTRA-ARTICULAR; INTRALESIONAL; INTRAMUSCULAR; INTRAVENOUS; SOFT TISSUE at 07:50

## 2023-07-20 RX ADMIN — EPHEDRINE SULFATE 10 MG: 50 INJECTION, SOLUTION INTRAVENOUS at 08:23

## 2023-07-20 RX ADMIN — CEFAZOLIN 2 G: 1 INJECTION, POWDER, FOR SOLUTION INTRAMUSCULAR; INTRAVENOUS at 07:30

## 2023-07-20 RX ADMIN — ONDANSETRON 4 MG: 2 INJECTION INTRAMUSCULAR; INTRAVENOUS at 07:50

## 2023-07-20 RX ADMIN — FENTANYL CITRATE 50 MCG: 50 INJECTION, SOLUTION INTRAMUSCULAR; INTRAVENOUS at 08:13

## 2023-07-20 RX ADMIN — Medication 100 MCG: at 08:13

## 2023-07-20 RX ADMIN — Medication 100 MCG: at 07:57

## 2023-07-20 RX ADMIN — FENTANYL CITRATE 100 MCG: 50 INJECTION, SOLUTION INTRAMUSCULAR; INTRAVENOUS at 07:35

## 2023-07-20 ASSESSMENT — FIBROSIS 4 INDEX: FIB4 SCORE: 0.41

## 2023-07-20 ASSESSMENT — PAIN SCALES - GENERAL: PAIN_LEVEL: 4

## 2023-07-20 NOTE — DISCHARGE INSTRUCTIONS
HOME CARE INSTRUCTIONS    ACTIVITY: Rest and take it easy for the first 24 hours.  A responsible adult is recommended to remain with you during that time.  It is normal to feel sleepy.  We encourage you to not do anything that requires balance, judgment or coordination.    FOR 24 HOURS DO NOT:  Drive, operate machinery or run household appliances.  Drink beer or alcoholic beverages.  Make important decisions or sign legal documents.    SPECIAL INSTRUCTIONS:     Activity as tolerated.   May remove incisional dressing tomorrow at 0900. Once dressing off may shower at that time. May remove drain bandaid 24 hours after removal. Keep dressing open to air.   Ok to get incision wet, do not submerge in water until steristrips off.   Keep steristrips 7-10 days.   No NSAIDs for one week.   Follow up in four weeks.   Pericolace OTC 2 daily while taking pain meds    DIET: To avoid nausea, slowly advance diet as tolerated, avoiding spicy or greasy foods for the first day.  Add more substantial food to your diet according to your physician's instructions.  Babies can be fed formula or breast milk as soon as they are hungry.  INCREASE FLUIDS AND FIBER TO AVOID CONSTIPATION.    MEDICATIONS: Resume taking daily medication.  Take prescribed pain medication with food.  If no medication is prescribed, you may take non-aspirin pain medication if needed.  PAIN MEDICATION CAN BE VERY CONSTIPATING.  Take a stool softener or laxative such as senokot, pericolace, or milk of magnesia if needed.    A follow-up appointment should be arranged with your doctor; call to schedule.    You should CALL YOUR PHYSICIAN if you develop:  Fever greater than 101 degrees F.  Pain not relieved by medication, or persistent nausea or vomiting.  Excessive bleeding (blood soaking through dressing) or unexpected drainage from the wound.  Extreme redness or swelling around the incision site, drainage of pus or foul smelling drainage.  Inability to urinate or empty  your bladder within 8 hours.  Problems with breathing or chest pain.    You should call 911 if you develop problems with breathing or chest pain.  If you are unable to contact your doctor or surgical center, you should go to the nearest emergency room or urgent care center.  Physician's telephone #: [phone]    MILD FLU-LIKE SYMPTOMS ARE NORMAL.  YOU MAY EXPERIENCE GENERALIZED MUSCLE ACHES, THROAT IRRITATION, HEADACHE AND/OR SOME NAUSEA.    If any questions arise, call your doctor.  If your doctor is not available, please feel free to call the Surgical Center at [phone].  The Center is open Monday through Friday from 7AM to 7PM.      A registered nurse may call you a few days after your surgery to see how you are doing after your procedure.    You may also receive a survey in the mail within the next two weeks and we ask that you take a few moments to complete the survey and return it to us.  Our goal is to provide you with very good care and we value your comments.     Depression / Suicide Risk    As you are discharged from this [facility] facility, it is important to learn how to keep safe from harming yourself.    Recognize the warning signs:  Abrupt changes in personality, positive or negative- including increase in energy   Giving away possessions  Change in eating patterns- significant weight changes-  positive or negative  Change in sleeping patterns- unable to sleep or sleeping all the time   Unwillingness or inability to communicate  Depression  Unusual sadness, discouragement and loneliness  Talk of wanting to die  Neglect of personal appearance   Rebelliousness- reckless behavior  Withdrawal from people/activities they love  Confusion- inability to concentrate     If you or a loved one observes any of these behaviors or has concerns about self-harm, here's what you can do:  Talk about it- your feelings and reasons for harming yourself  Remove any means that you might use to hurt yourself  (examples: pills, rope, extension cords, firearm)  Get professional help from the community (Mental Health, Substance Abuse, psychological counseling)  Do not be alone:Call your Safe Contact- someone whom you trust who will be there for you.  Call your local CRISIS HOTLINE 304-6598 or 993-674-0841  Call your local Children's Mobile Crisis Response Team [place] (660) 019-2493 or www.[employer]  Call the toll free National Suicide Prevention Hotlines   National Suicide Prevention Lifeline 969-992-GGUK (2839)  [facility] Line Network 800-SUICIDE (363-3251)    I acknowledge receipt and understanding of these Home Care instructions.

## 2023-07-20 NOTE — ANESTHESIA PREPROCEDURE EVALUATION
Case: [id number] Date/Time: 07/20/23 0715    Procedure: RIGHT L 4-5 LAMINECTOMY EXCISION OF CYST (Right: Spine Lumbar)    Anesthesia type: General    Pre-op diagnosis: SPINAL STENOSIS LUMBAR REGION    Location: [place] [address] / SURGERY [facility]    Surgeons: Clark Lanza M.D.          Relevant Problems   GI   (positive) Gastroesophageal reflux disease         (positive) Hepatic steatosis       Physical Exam    Airway   Mallampati: I  TM distance: >3 FB  Neck ROM: full       Cardiovascular - normal exam  Rhythm: regular  Rate: normal  (-) murmur     Dental - normal exam  Comments: Pt has a chip out of tooth #8         Pulmonary - normal exam  Breath sounds clear to auscultation     Abdominal    Neurological - normal exam               Anesthesia Plan    ASA 2       Plan - general       Airway plan will be ETT          Induction: intravenous    Postoperative Plan: Postoperative administration of opioids is intended.    Pertinent diagnostic labs and testing reviewed    Informed Consent:    Anesthetic plan and risks discussed with patient.    Use of blood products discussed with: patient whom consented to blood products.

## 2023-07-20 NOTE — OR NURSING
1010: Report received from MARIE Aguilar. Patient to Phase II 32 pending transport. Patient provided with water. Patient tolerating PO intake. 0 output to Hemovac per report.    1015: Patient to Phase II Rm 32. Lumbar dressing CDI with zero output to hemovac. Vitals taken upon arrival. Plan of care discussed with patient. Patient belongings returned to patient at bedside.    1020: Patient and family updated regarding Plan of care.    1025: Patient ambulates    1030: Patient voids    1034: Hemovac emptied: 8cc    1040: OR contacted for discharge orders. Orders received from YUE Roberts.  - take down upper dressing  - Remove stitch  - Remove drain  - Place Band-Aid  - YUE Horowitz to see patient at bedside prior to discharge.    1050: Hemovac removed per protocol. Patient tolerated removal. No further drainage. Fresh dressing placed.    1110: Pt discharged home. Discharge instructions given to pt prior to leaving unit including when to see PCP, and new medications if applicable. PIV removed. All questions answered. Verbalized understanding. All belongings with pt when leaving unit via foot with family.

## 2023-07-20 NOTE — OP REPORT
DATE OF SERVICE:  07/20/2023     PREOPERATIVE DIAGNOSES:   Right L5 radiculopathy secondary to stenosis plus   epidural cyst.     POSTOPERATIVE DIAGNOSES:  Right L5 radiculopathy secondary to stenosis plus   epidural cyst.     OPERATIONS:  Right L4-5 partial laminectomies, medial facetectomy, excision of   densely adherent epidural cyst, extensive decompression of thecal sac and left   L5 nerve root.     SURGEON:  Clark Lanza MD     ASSISTANT:  LEONEL Chapa     ANESTHESIA:  General endotracheal.     ANESTHESIOLOGIST:  Baljit Rice MD     PREPARATION:  ChloraPrep.     MEDICATIONS:  The patient given Ancef prior to incision.     INDICATIONS:  This woman with right sided radicular pain refractory to   nonoperative therapies.  She had lateral recess stenosis due to spondylosis   plus evidence of epidural cyst.  The patient was felt to be a candidate for   operative decompression to relieve radicular pain.  The patient understood   major risks and complications, such as paralysis exceedingly rare, biggest   risk of surgery is nonresponse, which is 10%, small risk of wound infection,   spinal fluid leak, chance to require another operation rest of her life 15%.    The patient is understanding and agreed to proceed and signed consent.     NEED FOR SURGICAL ASSISTANCE:  Surgical assistant required throughout the case   for retraction, suction, irrigation, cleaning of instruments, keep the case   moving forward to minimize operative time.     DESCRIPTION OF PROCEDURE:  The patient was brought to the operating room.    Peripheral venous lines in place.  General anesthesia was induced.  The   patient intubated.  No Nicole catheter was placed.  The patient laid prone on   the OSI table using 6 bolsters.  Pressure points carefully padded.  The back   was shaved, doubly prepped by myself with ChloraPrep and draped.  Planned   incision was marked in the midline over spinous processes of 4 and 5.  Skin   was  infiltrated with local and incised with scalpel using electrocautery,   dissected deep in the midline down to and through deep fashion using a   subperiosteal dissection.  Paravertebral muscles dissected off spinous   processes of 4 and 5 on the right..  Levels confirmed with intraoperative   fluoroscopy.  Using HOMETRAX[employer] Silvino drill AM-8 bit, inferior half of the lamina 4,   the superior two-thirds of lamina 5, the medial facet was drilled down to thin   shelf of bone.  This thin shelf of bone along with the ligamentum flavum   removed in a piecemeal fashion with 2 and 3 mm Kerrisons.  The ligamentum   flavum was markedly adherent to the dura as well as the epidural cyst, which   was exposed by removing the superior two-thirds of lamina 5.  This was   resected sharply with curettes and pituitary rongeurs.  The 5 root was   identified completely decompressed in the lateral recess.  We worked to the   lower border of the 5 pedicle.  Disk was inspected.  No disk herniation.  I   could slide the nerve probe superiorly along the lateral recess around the 5   pedicle without compromise inferiorly underneath the remaining portion of the   5 lamina without any further compromise of the thecal sac and right L5 nerve   root were freely decompressed.  Of note, the patient had considerable   degenerated facet hypertrophy at the 4-5 facet.  Once decompression was   completed, epidural bleeding was controlled with Gelfoam powder mixed with   thrombin, subsequently irrigated away plus bipolar electrocautery.  Wound was   irrigated with antibiotic irrigation.  Muscle bleeders controlled with bipolar   electrocautery.  Medium Hemovac drain was placed in depth of the wound,   brought out through a separate stab incision.  Deep fascia was closed with 0   Vicryl, subcutaneous fascia with 0 Vicryl, subcuticular closed with 3-0   Vicryl, and skin edges approximated with quarter-inch Steri-Strips.  Drain was   sutured in place at the exit site  with a 2-0 Vicryl, connected to closed   drainage system.  Sterile dressings were placed.  The patient laid supine on   the bed, extubated, taken to recovery room in satisfactory condition.  The   patient tolerated the procedure well without complication.     ESTIMATED BLOOD LOSS:  Less than 100 mL.           ______________________________  MD RADHA ZHANG/KARAN    DD:  07/20/2023 09:15  DT:  07/20/2023 10:44    Job#:  [id number]    CC:Baljit Rice MD

## 2023-07-20 NOTE — ANESTHESIA POSTPROCEDURE EVALUATION
Patient: Shana Dunn    Procedure Summary     Date: 07/20/23 Room / Location: [place] [address] / SURGERY [facility]    Anesthesia Start: 0730 Anesthesia Stop: 0901    Procedure: RIGHT L 4-5 LAMINECTOMY EXCISION OF CYST (Right: Spine Lumbar) Diagnosis: (SPINAL STENOSIS LUMBAR REGION)    Surgeons: Clark Lanza M.D. Responsible Provider: Baljit Rice M.D.    Anesthesia Type: general ASA Status: 2          Final Anesthesia Type: general  Last vitals  BP   Blood Pressure: 94/58    Temp   36.5 °C (97.7 °F)    Pulse   68   Resp   12    SpO2   94 %      Anesthesia Post Evaluation    Patient location during evaluation: PACU  Patient participation: complete - patient participated  Level of consciousness: awake and alert  Pain score: 4    Airway patency: patent  Anesthetic complications: no  Cardiovascular status: hemodynamically stable  Respiratory status: acceptable  Hydration status: euvolemic    PONV: none          No notable events documented.     Nurse Pain Score: 0 (NPRS)

## 2023-07-20 NOTE — ADDENDUM NOTE
Addendum  created 07/20/23 1014 by Baljit Rice M.D.    Clinical Note Signed, Order list changed, Pharmacy for encounter modified

## 2023-07-20 NOTE — ANESTHESIA PROCEDURE NOTES
Airway    Date/Time: 7/20/2023 7:36 AM    Performed by: Baljit Rice M.D.  Authorized by: Baljit Rice M.D.    Location:  OR  Urgency:  Elective  Indications for Airway Management:  Anesthesia      Spontaneous Ventilation: absent    Sedation Level:  Deep  Preoxygenated: Yes    Patient Position:  Sniffing  Final Airway Type:  Endotracheal airway  Final Endotracheal Airway:  ETT  Cuffed: Yes    Technique Used for Successful ETT Placement:  Direct laryngoscopy    Insertion Site:  Oral  Blade Type:  Dunia  Laryngoscope Blade/Videolaryngoscope Blade Size:  3  ETT Size (mm):  7.0  Measured from:  Teeth  ETT to Teeth (cm):  21  Placement Verified by: auscultation and capnometry    Cormack-Lehane Classification:  Grade I - full view of glottis  Number of Attempts at Approach:  1

## 2023-07-20 NOTE — ANESTHESIA TIME REPORT
Anesthesia Start and Stop Event Times     Date Time Event    7/20/2023 0710 Ready for Procedure     0730 Anesthesia Start     0901 Anesthesia Stop        Responsible Staff  07/20/23    Name Role Begin End    Baljit Rice M.D. Anesth 0730 0901        Overtime Reason:  no overtime (within assigned shift)    Comments:

## 2023-07-21 PROCEDURE — RXMED WILLOW AMBULATORY MEDICATION CHARGE: Performed by: NURSE PRACTITIONER

## 2023-07-26 ENCOUNTER — PHARMACY VISIT (OUTPATIENT)
Dept: PHARMACY | Facility: MEDICAL CENTER | Age: 58
End: 2023-07-26
Payer: COMMERCIAL

## 2023-07-26 PROCEDURE — RXMED WILLOW AMBULATORY MEDICATION CHARGE: Performed by: NURSE PRACTITIONER

## 2023-07-31 ENCOUNTER — TELEPHONE (OUTPATIENT)
Dept: MEDICAL GROUP | Facility: MEDICAL CENTER | Age: 58
End: 2023-07-31
Payer: COMMERCIAL

## 2023-07-31 NOTE — TELEPHONE ENCOUNTER
FINAL PRIOR AUTHORIZATION STATUS:    1.  Name of Medication & Dose: Mounjaro pen injectors      2. Prior Auth Status: Denied.  Reason: per insurance patient didn't met criteria  *denial scanned in media*     3. Action Taken: Pharmacy Notified: N\A Patient Notified: N\A

## 2023-08-02 PROCEDURE — RXMED WILLOW AMBULATORY MEDICATION CHARGE: Performed by: ORTHOPAEDIC SURGERY

## 2023-08-04 ENCOUNTER — PHARMACY VISIT (OUTPATIENT)
Dept: PHARMACY | Facility: MEDICAL CENTER | Age: 58
End: 2023-08-04
Payer: COMMERCIAL

## 2023-08-04 PROCEDURE — RXOTC WILLOW AMBULATORY OTC CHARGE

## 2023-08-21 ENCOUNTER — TELEPHONE (OUTPATIENT)
Dept: MEDICAL GROUP | Facility: MEDICAL CENTER | Age: 58
End: 2023-08-21
Payer: COMMERCIAL

## 2023-08-21 NOTE — TELEPHONE ENCOUNTER
FINAL PRIOR AUTHORIZATION STATUS:    1.  Name of Medication & Dose:  Mounjaro 10MG/0.5ML pen-injectors    2. Prior Auth Status: Denied.  Reason:   The above-named medication has only been approved for the treatment of type 2 diabetes by the Food and Drug Administration (FDA). Using this medication for other medical conditions, (such as weight loss, pre-diabetes, or other medical conditions) is considered experimental and/or investigational (off label use) by the FDA. Per your Summary of Benefits, off label use is not a covered benefit. The records submitted by your provider show that you are not being treated for type 2 diabetes, as such, coverage of this medication is excluded from your plan. If you choose to use this medication for a non-FDA approved treatment, you may be responsible to pay 100% of the cost. Contact your provider to determine if an alternative medication on our formulary is appropriate to treat your medical condition.    3. Action Taken: Pharmacy Notified: no Patient Notified: no

## 2023-08-25 DIAGNOSIS — E53.8 FOLIC ACID DEFICIENCY: ICD-10-CM

## 2023-08-25 RX ORDER — FOLIC ACID 1 MG/1
1 TABLET ORAL DAILY
Qty: 90 TABLET | Refills: 3 | Status: SHIPPED | OUTPATIENT
Start: 2023-08-25 | End: 2023-08-27 | Stop reason: SDUPTHER

## 2023-08-27 DIAGNOSIS — E53.8 FOLIC ACID DEFICIENCY: ICD-10-CM

## 2023-08-28 RX ORDER — FOLIC ACID 1 MG/1
1 TABLET ORAL DAILY
Qty: 90 TABLET | Refills: 3 | Status: SHIPPED | OUTPATIENT
Start: 2023-08-28

## 2023-08-29 PROCEDURE — RXMED WILLOW AMBULATORY MEDICATION CHARGE: Performed by: NURSE PRACTITIONER

## 2023-08-29 PROCEDURE — RXMED WILLOW AMBULATORY MEDICATION CHARGE: Performed by: ORTHOPAEDIC SURGERY

## 2023-09-08 PROCEDURE — RXMED WILLOW AMBULATORY MEDICATION CHARGE: Performed by: NURSE PRACTITIONER

## 2023-09-13 ENCOUNTER — PHARMACY VISIT (OUTPATIENT)
Dept: PHARMACY | Facility: MEDICAL CENTER | Age: 58
End: 2023-09-13
Payer: COMMERCIAL

## 2023-09-18 ENCOUNTER — APPOINTMENT (OUTPATIENT)
Dept: MEDICAL GROUP | Facility: MEDICAL CENTER | Age: 58
End: 2023-09-18
Payer: COMMERCIAL

## 2023-09-20 ENCOUNTER — APPOINTMENT (OUTPATIENT)
Dept: MEDICAL GROUP | Facility: MEDICAL CENTER | Age: 58
End: 2023-09-20
Payer: COMMERCIAL

## 2023-10-11 ENCOUNTER — PHARMACY VISIT (OUTPATIENT)
Dept: PHARMACY | Facility: MEDICAL CENTER | Age: 58
End: 2023-10-11
Payer: COMMERCIAL

## 2023-10-11 PROCEDURE — RXMED WILLOW AMBULATORY MEDICATION CHARGE: Performed by: ORTHOPAEDIC SURGERY

## 2023-10-11 RX ORDER — TIRZEPATIDE 7.5 MG/.5ML
7.5 INJECTION, SOLUTION SUBCUTANEOUS
Qty: 2 ML | Refills: 1 | Status: SHIPPED | OUTPATIENT
Start: 2023-10-11 | End: 2023-11-27 | Stop reason: SDUPTHER

## 2023-10-19 ENCOUNTER — TELEMEDICINE (OUTPATIENT)
Dept: MEDICAL GROUP | Facility: MEDICAL CENTER | Age: 58
End: 2023-10-19
Payer: COMMERCIAL

## 2023-10-19 VITALS — WEIGHT: 192 LBS | HEIGHT: 68 IN | RESPIRATION RATE: 16 BRPM | BODY MASS INDEX: 29.1 KG/M2

## 2023-10-19 DIAGNOSIS — R73.03 PRE-DIABETES: Chronic | ICD-10-CM

## 2023-10-19 DIAGNOSIS — F41.0 PANIC ATTACKS: ICD-10-CM

## 2023-10-19 DIAGNOSIS — F41.1 GENERALIZED ANXIETY DISORDER: Chronic | ICD-10-CM

## 2023-10-19 DIAGNOSIS — Z12.31 ENCOUNTER FOR SCREENING MAMMOGRAM FOR BREAST CANCER: ICD-10-CM

## 2023-10-19 DIAGNOSIS — Z12.11 SCREENING FOR COLORECTAL CANCER: ICD-10-CM

## 2023-10-19 DIAGNOSIS — N95.1 MENOPAUSAL STATE: ICD-10-CM

## 2023-10-19 DIAGNOSIS — E53.8 FOLIC ACID DEFICIENCY: Chronic | ICD-10-CM

## 2023-10-19 DIAGNOSIS — Z12.12 SCREENING FOR COLORECTAL CANCER: ICD-10-CM

## 2023-10-19 DIAGNOSIS — E78.5 DYSLIPIDEMIA: Chronic | ICD-10-CM

## 2023-10-19 PROBLEM — E66.01 SEVERE OBESITY (BMI 35.0-39.9) WITH COMORBIDITY (HCC): Chronic | Status: RESOLVED | Noted: 2022-12-08 | Resolved: 2023-10-19

## 2023-10-19 PROCEDURE — 99214 OFFICE O/P EST MOD 30 MIN: CPT | Mod: 95 | Performed by: NURSE PRACTITIONER

## 2023-10-19 RX ORDER — ALPRAZOLAM 0.25 MG/1
0.25 TABLET ORAL
Qty: 30 TABLET | Refills: 0 | Status: SHIPPED | OUTPATIENT
Start: 2023-10-19 | End: 2023-12-13

## 2023-10-19 ASSESSMENT — PATIENT HEALTH QUESTIONNAIRE - PHQ9: CLINICAL INTERPRETATION OF PHQ2 SCORE: 0

## 2023-10-19 ASSESSMENT — FIBROSIS 4 INDEX: FIB4 SCORE: 0.41

## 2023-10-19 NOTE — PROGRESS NOTES
Virtual Visit: Established Patient   This visit was conducted via Zoom using secure and encrypted videoconferencing technology.   The patient was in their home in the state of [place].    The patient's identity was confirmed and verbal consent was obtained for this virtual visit.    Subjective:   CC:   Chief Complaint   Patient presents with    Follow-Up     Request labs, discuss weight      Shana Dunn is a 58 y.o. female presenting for evaluation and management of:    Patient presents today as she is requesting to have blood work, refill on Xanax.    Some night sweats/hot flashes.  She is wondering if she needs to take estradiol.  Would like to have female hormone labs ordered.    Taking Folilc acid supplement -previously deficient.    Due for mammogram-she will try to get this scheduled.     Would like refill on Xanax as she does use this for panic attacks.  She uses this very sparingly.   checked.    Prediabetes:  Taking Mounjaro 7.5mg weekly for 500$  No SE's. Working well for her.  +weight loss  +drop in A1c.    Current medicines (including changes today)  Current Outpatient Medications   Medication Sig Dispense Refill    ALPRAZolam (XANAX) 0.25 MG Tab Take 1 Tablet by mouth 1 time a day as needed for Sleep or Anxiety for up to 30 days. 30 Tablet 0    Tirzepatide (MOUNJARO) 7.5 MG/0.5ML Solution Pen-injector Inject 7.5 mg under the skin every 7 days. 2 mL 1    folic acid (FOLVITE) 1 MG Tab Take 1 Tablet by mouth every day. 90 Tablet 3    citalopram (CELEXA) 40 MG Tab Take 1 Tablet by mouth every day. 90 Tablet 3    hydroCHLOROthiazide (HYDRODIURIL) 25 MG Tab Take 1 Tablet by mouth every day. 90 Tablet 2    lisinopril (PRINIVIL) 5 MG Tab Take 1 Tablet by mouth every day. 90 Tablet 2    lansoprazole (PREVACID) 30 MG CAPSULE DELAYED RELEASE Take 1 Capsule by mouth every day. 90 Capsule 2    celecoxib (CELEBREX) 200 MG Cap Take 1 capsule by mouth 2 times a day. 60 Capsule 2     No current  "facility-administered medications for this visit.       Patient Active Problem List    Diagnosis Date Noted    Synovial cyst of lumbar facet joint 12/08/2022    Degenerative arthropathy of spinal facet joint 12/08/2022    Spinal stenosis of lumbar region without neurogenic claudication 12/08/2022    Hepatic steatosis 08/25/2020    Elevated ferritin 08/17/2020    Folic acid deficiency 08/17/2020    Osteoarthritis of knee 05/28/2019    Multiple thyroid nodules 04/25/2019    Family history of hemochromatosis 04/25/2019    ASHLY (generalized anxiety disorder) 07/20/2018    Elevated uric acid in blood 07/20/2018    Dyslipidemia 06/20/2018    Gastroesophageal reflux disease 06/20/2018    Arthralgia 06/20/2018    Pre-diabetes 06/20/2018    Lichen sclerosus 06/20/2018        Objective:   Resp 16   Ht 1.727 m (5' 8\")   Wt 87.1 kg (192 lb)   BMI 29.19 kg/m²     Physical Exam:  Constitutional: Alert, no distress, well-groomed.  Skin: No rashes in visible areas.  Eye: Round. Conjunctiva clear, lids normal. No icterus.   ENMT: Lips pink without lesions, good dentition, moist mucous membranes. Phonation normal.  Neck: No masses, no thyromegaly. Moves freely without pain.  Respiratory: Unlabored respiratory effort, no cough or audible wheeze  Psych: Alert and oriented x3, normal affect and mood.     Assessment and Plan:   The following treatment plan was discussed:     1. Menopausal state  Not too bothered by vasomotor symptoms.  She would like to update her blood work.  We have briefly discussed the pros and cons of treatment with HRT.  At this time would recommend avoiding hormone therapy given her symptoms are mild.  - FSH/LH; Future  - ESTRADIOL; Future    2. Dyslipidemia  - Lipid Profile; Future  - Comp Metabolic Panel; Future  - TSH; Future    3. ASHLY (generalized anxiety disorder)  Stable.  Continue Celexa.    4. Panic attacks  Chronic, stable.  As needed Xanax.  Have sent a refill.   checked.  Continue to avoid using " medication daily.  Avoid alcohol, driving while on medication.  - ALPRAZolam (XANAX) 0.25 MG Tab; Take 1 Tablet by mouth 1 time a day as needed for Sleep or Anxiety for up to 30 days.  Dispense: 30 Tablet; Refill: 0    5. Pre-diabetes  Improved with lifestyle modifications, Mounjaro.  Recommend ongoing use of Mounjaro.    6. Folic acid deficiency  Continue folic acid supplement.  Update lab.  - FOLATE; Future    7. Screening for colorectal cancer  - Referral to GI for Colonoscopy    8. Encounter for screening mammogram for breast cancer  - MA-SCREENING MAMMO BILAT W/TOMOSYNTHESIS W/CAD; Future      Follow-up: No follow-ups on file.

## 2023-10-20 PROCEDURE — RXMED WILLOW AMBULATORY MEDICATION CHARGE: Performed by: NURSE PRACTITIONER

## 2023-10-23 ENCOUNTER — PHARMACY VISIT (OUTPATIENT)
Dept: PHARMACY | Facility: MEDICAL CENTER | Age: 58
End: 2023-10-23
Payer: COMMERCIAL

## 2023-10-23 PROCEDURE — RXMED WILLOW AMBULATORY MEDICATION CHARGE: Performed by: NURSE PRACTITIONER

## 2023-10-25 ENCOUNTER — PHARMACY VISIT (OUTPATIENT)
Dept: PHARMACY | Facility: MEDICAL CENTER | Age: 58
End: 2023-10-25
Payer: COMMERCIAL

## 2023-10-30 ENCOUNTER — APPOINTMENT (OUTPATIENT)
Dept: RADIOLOGY | Facility: MEDICAL CENTER | Age: 58
End: 2023-10-30
Attending: NURSE PRACTITIONER
Payer: COMMERCIAL

## 2023-11-08 PROCEDURE — RXMED WILLOW AMBULATORY MEDICATION CHARGE: Performed by: NURSE PRACTITIONER

## 2023-11-13 ENCOUNTER — PHARMACY VISIT (OUTPATIENT)
Dept: PHARMACY | Facility: MEDICAL CENTER | Age: 58
End: 2023-11-13
Payer: COMMERCIAL

## 2023-11-13 PROCEDURE — RXMED WILLOW AMBULATORY MEDICATION CHARGE: Performed by: NURSE PRACTITIONER

## 2023-11-15 ENCOUNTER — PHARMACY VISIT (OUTPATIENT)
Dept: PHARMACY | Facility: MEDICAL CENTER | Age: 58
End: 2023-11-15
Payer: COMMERCIAL

## 2023-11-15 PROCEDURE — RXMED WILLOW AMBULATORY MEDICATION CHARGE: Performed by: NURSE PRACTITIONER

## 2023-11-17 ENCOUNTER — HOSPITAL ENCOUNTER (OUTPATIENT)
Dept: LAB | Facility: MEDICAL CENTER | Age: 58
End: 2023-11-17
Attending: NURSE PRACTITIONER
Payer: COMMERCIAL

## 2023-11-17 DIAGNOSIS — E78.5 DYSLIPIDEMIA: Chronic | ICD-10-CM

## 2023-11-17 DIAGNOSIS — E53.8 FOLIC ACID DEFICIENCY: Chronic | ICD-10-CM

## 2023-11-17 DIAGNOSIS — N95.1 MENOPAUSAL STATE: ICD-10-CM

## 2023-11-17 LAB
ALBUMIN SERPL BCP-MCNC: 4.4 G/DL (ref 3.2–4.9)
ALBUMIN/GLOB SERPL: 2 G/DL
ALP SERPL-CCNC: 74 U/L (ref 30–99)
ALT SERPL-CCNC: 19 U/L (ref 2–50)
ANION GAP SERPL CALC-SCNC: 9 MMOL/L (ref 7–16)
AST SERPL-CCNC: 17 U/L (ref 12–45)
BILIRUB SERPL-MCNC: 0.8 MG/DL (ref 0.1–1.5)
BUN SERPL-MCNC: 18 MG/DL (ref 8–22)
CALCIUM ALBUM COR SERPL-MCNC: 9.3 MG/DL (ref 8.5–10.5)
CALCIUM SERPL-MCNC: 9.6 MG/DL (ref 8.5–10.5)
CHLORIDE SERPL-SCNC: 102 MMOL/L (ref 96–112)
CHOLEST SERPL-MCNC: 235 MG/DL (ref 100–199)
CO2 SERPL-SCNC: 26 MMOL/L (ref 20–33)
CREAT SERPL-MCNC: 0.7 MG/DL (ref 0.5–1.4)
ESTRADIOL SERPL-MCNC: 85.5 PG/ML
FASTING STATUS PATIENT QL REPORTED: NORMAL
FOLATE SERPL-MCNC: 36.5 NG/ML
FSH SERPL-ACNC: 63.9 MIU/ML
GFR SERPLBLD CREATININE-BSD FMLA CKD-EPI: 100 ML/MIN/1.73 M 2
GLOBULIN SER CALC-MCNC: 2.2 G/DL (ref 1.9–3.5)
GLUCOSE SERPL-MCNC: 86 MG/DL (ref 65–99)
HDLC SERPL-MCNC: 48 MG/DL
LDLC SERPL CALC-MCNC: 157 MG/DL
LH SERPL-ACNC: 31.1 IU/L
POTASSIUM SERPL-SCNC: 4.2 MMOL/L (ref 3.6–5.5)
PROT SERPL-MCNC: 6.6 G/DL (ref 6–8.2)
SODIUM SERPL-SCNC: 137 MMOL/L (ref 135–145)
TRIGL SERPL-MCNC: 152 MG/DL (ref 0–149)
TSH SERPL DL<=0.005 MIU/L-ACNC: 2.05 UIU/ML (ref 0.38–5.33)

## 2023-11-17 PROCEDURE — 83001 ASSAY OF GONADOTROPIN (FSH): CPT

## 2023-11-17 PROCEDURE — 36415 COLL VENOUS BLD VENIPUNCTURE: CPT

## 2023-11-17 PROCEDURE — 80053 COMPREHEN METABOLIC PANEL: CPT

## 2023-11-17 PROCEDURE — 80061 LIPID PANEL: CPT

## 2023-11-17 PROCEDURE — 82746 ASSAY OF FOLIC ACID SERUM: CPT

## 2023-11-17 PROCEDURE — 82670 ASSAY OF TOTAL ESTRADIOL: CPT

## 2023-11-17 PROCEDURE — 83002 ASSAY OF GONADOTROPIN (LH): CPT

## 2023-11-17 PROCEDURE — 84443 ASSAY THYROID STIM HORMONE: CPT

## 2023-11-20 ENCOUNTER — APPOINTMENT (OUTPATIENT)
Dept: RADIOLOGY | Facility: MEDICAL CENTER | Age: 58
End: 2023-11-20
Attending: NURSE PRACTITIONER
Payer: COMMERCIAL

## 2023-11-22 ENCOUNTER — HOSPITAL ENCOUNTER (OUTPATIENT)
Dept: RADIOLOGY | Facility: MEDICAL CENTER | Age: 58
End: 2023-11-22
Attending: NURSE PRACTITIONER
Payer: COMMERCIAL

## 2023-11-22 DIAGNOSIS — Z12.31 ENCOUNTER FOR SCREENING MAMMOGRAM FOR BREAST CANCER: ICD-10-CM

## 2023-11-22 PROCEDURE — 77063 BREAST TOMOSYNTHESIS BI: CPT

## 2023-11-28 RX ORDER — TIRZEPATIDE 7.5 MG/.5ML
7.5 INJECTION, SOLUTION SUBCUTANEOUS
Qty: 2 ML | Refills: 1 | Status: SHIPPED | OUTPATIENT
Start: 2023-11-28

## 2023-12-07 PROCEDURE — RXMED WILLOW AMBULATORY MEDICATION CHARGE: Performed by: NURSE PRACTITIONER

## 2023-12-11 ENCOUNTER — PHARMACY VISIT (OUTPATIENT)
Dept: PHARMACY | Facility: MEDICAL CENTER | Age: 58
End: 2023-12-11
Payer: COMMERCIAL

## 2024-01-02 PROCEDURE — RXMED WILLOW AMBULATORY MEDICATION CHARGE: Performed by: ORTHOPAEDIC SURGERY

## 2024-01-02 PROCEDURE — RXMED WILLOW AMBULATORY MEDICATION CHARGE: Performed by: NURSE PRACTITIONER

## 2024-01-03 ENCOUNTER — PHARMACY VISIT (OUTPATIENT)
Dept: PHARMACY | Facility: MEDICAL CENTER | Age: 59
End: 2024-01-03
Payer: COMMERCIAL

## 2024-01-03 DIAGNOSIS — F41.0 PANIC ATTACKS: ICD-10-CM

## 2024-01-03 DIAGNOSIS — F41.1 GENERALIZED ANXIETY DISORDER: ICD-10-CM

## 2024-01-04 RX ORDER — CITALOPRAM 40 MG/1
40 TABLET ORAL
Qty: 90 TABLET | Refills: 3 | Status: SHIPPED | OUTPATIENT
Start: 2024-01-04

## 2024-01-17 ENCOUNTER — TELEPHONE (OUTPATIENT)
Dept: MEDICAL GROUP | Facility: MEDICAL CENTER | Age: 59
End: 2024-01-17

## 2024-01-17 RX ORDER — TIRZEPATIDE 2.5 MG/.5ML
0.5 INJECTION, SOLUTION SUBCUTANEOUS
Qty: 6 ML | Refills: 1 | Status: SHIPPED
Start: 2024-01-17 | End: 2024-01-19 | Stop reason: SDUPTHER

## 2024-01-17 NOTE — TELEPHONE ENCOUNTER
FINAL PRIOR AUTHORIZATION STATUS:    1.  Name of Medication & Dose: Tirzepatide (MOUNJARO)      2. Prior Auth Status: Denied.  Reason: Does not meet requirements     3. Action Taken: Pharmacy Notified: no Patient Notified: yes

## 2024-01-19 RX ORDER — TIRZEPATIDE 2.5 MG/.5ML
0.5 INJECTION, SOLUTION SUBCUTANEOUS
Qty: 6 ML | Refills: 1 | Status: SHIPPED | OUTPATIENT
Start: 2024-01-19

## 2024-01-26 PROCEDURE — RXMED WILLOW AMBULATORY MEDICATION CHARGE: Performed by: ORTHOPAEDIC SURGERY

## 2024-01-29 PROCEDURE — RXMED WILLOW AMBULATORY MEDICATION CHARGE: Performed by: NURSE PRACTITIONER

## 2024-02-01 DIAGNOSIS — F41.0 PANIC ATTACKS: ICD-10-CM

## 2024-02-01 RX ORDER — ALPRAZOLAM 0.25 MG/1
0.25 TABLET ORAL
Qty: 30 TABLET | Refills: 1 | Status: SHIPPED | OUTPATIENT
Start: 2024-02-01 | End: 2024-03-08

## 2024-02-02 PROCEDURE — RXMED WILLOW AMBULATORY MEDICATION CHARGE: Performed by: NURSE PRACTITIONER

## 2024-02-06 ENCOUNTER — PHARMACY VISIT (OUTPATIENT)
Dept: PHARMACY | Facility: MEDICAL CENTER | Age: 59
End: 2024-02-06
Payer: COMMERCIAL

## 2024-02-07 ENCOUNTER — PHARMACY VISIT (OUTPATIENT)
Dept: PHARMACY | Facility: MEDICAL CENTER | Age: 59
End: 2024-02-07
Payer: COMMERCIAL

## 2024-03-06 PROCEDURE — RXMED WILLOW AMBULATORY MEDICATION CHARGE: Performed by: ORTHOPAEDIC SURGERY

## 2024-03-06 PROCEDURE — RXMED WILLOW AMBULATORY MEDICATION CHARGE: Performed by: NURSE PRACTITIONER

## 2024-03-07 ENCOUNTER — PHARMACY VISIT (OUTPATIENT)
Dept: PHARMACY | Facility: MEDICAL CENTER | Age: 59
End: 2024-03-07
Payer: COMMERCIAL

## 2024-04-05 DIAGNOSIS — F41.0 PANIC ATTACKS: ICD-10-CM

## 2024-04-05 DIAGNOSIS — F41.1 GENERALIZED ANXIETY DISORDER: ICD-10-CM

## 2024-04-05 PROCEDURE — RXMED WILLOW AMBULATORY MEDICATION CHARGE: Performed by: NURSE PRACTITIONER

## 2024-04-05 RX ORDER — CITALOPRAM 40 MG/1
40 TABLET ORAL
Qty: 90 TABLET | Refills: 3 | Status: SHIPPED | OUTPATIENT
Start: 2024-04-05

## 2024-04-08 ENCOUNTER — PHARMACY VISIT (OUTPATIENT)
Dept: PHARMACY | Facility: MEDICAL CENTER | Age: 59
End: 2024-04-08
Payer: COMMERCIAL

## 2024-04-18 ENCOUNTER — PHARMACY VISIT (OUTPATIENT)
Dept: PHARMACY | Facility: MEDICAL CENTER | Age: 59
End: 2024-04-18
Payer: COMMERCIAL

## 2024-04-18 PROCEDURE — RXMED WILLOW AMBULATORY MEDICATION CHARGE: Performed by: NURSE PRACTITIONER

## 2024-05-24 PROCEDURE — RXMED WILLOW AMBULATORY MEDICATION CHARGE: Performed by: NURSE PRACTITIONER

## 2024-05-24 RX ORDER — TIRZEPATIDE 2.5 MG/.5ML
0.5 INJECTION, SOLUTION SUBCUTANEOUS
Qty: 6 ML | Refills: 1 | Status: SHIPPED | OUTPATIENT
Start: 2024-05-24

## 2024-05-24 RX ORDER — TIRZEPATIDE 7.5 MG/.5ML
7.5 INJECTION, SOLUTION SUBCUTANEOUS
Qty: 2 ML | Refills: 1 | Status: SHIPPED | OUTPATIENT
Start: 2024-05-24

## 2024-05-29 ENCOUNTER — PHARMACY VISIT (OUTPATIENT)
Dept: PHARMACY | Facility: MEDICAL CENTER | Age: 59
End: 2024-05-29
Payer: COMMERCIAL

## 2024-07-19 PROCEDURE — RXMED WILLOW AMBULATORY MEDICATION CHARGE: Performed by: ORTHOPAEDIC SURGERY

## 2024-07-19 PROCEDURE — RXMED WILLOW AMBULATORY MEDICATION CHARGE: Performed by: NURSE PRACTITIONER

## 2024-07-22 PROCEDURE — RXMED WILLOW AMBULATORY MEDICATION CHARGE: Performed by: NURSE PRACTITIONER

## 2024-07-23 ENCOUNTER — PHARMACY VISIT (OUTPATIENT)
Dept: PHARMACY | Facility: MEDICAL CENTER | Age: 59
End: 2024-07-23
Payer: COMMERCIAL

## 2024-07-23 PROCEDURE — RXMED WILLOW AMBULATORY MEDICATION CHARGE: Performed by: FAMILY MEDICINE

## 2024-07-23 RX ORDER — LANSOPRAZOLE 30 MG/1
CAPSULE, DELAYED RELEASE ORAL
Qty: 90 CAPSULE | Refills: 3 | OUTPATIENT
Start: 2024-07-23

## 2024-07-23 RX ORDER — CELECOXIB 200 MG/1
CAPSULE ORAL
Qty: 90 CAPSULE | Refills: 3 | OUTPATIENT
Start: 2024-07-23

## 2024-07-23 RX ORDER — LISINOPRIL 5 MG/1
TABLET ORAL
Qty: 90 TABLET | Refills: 3 | OUTPATIENT
Start: 2024-07-23

## 2024-07-23 RX ORDER — HYDROCHLOROTHIAZIDE 25 MG/1
TABLET ORAL
Qty: 90 TABLET | Refills: 3 | OUTPATIENT
Start: 2024-07-23

## 2024-07-23 RX ORDER — CITALOPRAM 40 MG/1
TABLET ORAL
Qty: 45 TABLET | Refills: 3 | OUTPATIENT
Start: 2024-07-23

## 2024-08-28 ENCOUNTER — PHARMACY VISIT (OUTPATIENT)
Dept: PHARMACY | Facility: MEDICAL CENTER | Age: 59
End: 2024-08-28
Payer: COMMERCIAL

## 2024-08-29 PROCEDURE — RXMED WILLOW AMBULATORY MEDICATION CHARGE: Performed by: FAMILY MEDICINE

## 2024-09-04 ENCOUNTER — PHARMACY VISIT (OUTPATIENT)
Dept: PHARMACY | Facility: MEDICAL CENTER | Age: 59
End: 2024-09-04
Payer: COMMERCIAL

## 2024-10-03 PROCEDURE — RXMED WILLOW AMBULATORY MEDICATION CHARGE: Performed by: FAMILY MEDICINE

## 2024-10-16 ENCOUNTER — PHARMACY VISIT (OUTPATIENT)
Dept: PHARMACY | Facility: MEDICAL CENTER | Age: 59
End: 2024-10-16
Payer: COMMERCIAL

## 2024-10-23 ENCOUNTER — PHARMACY VISIT (OUTPATIENT)
Dept: PHARMACY | Facility: MEDICAL CENTER | Age: 59
End: 2024-10-23
Payer: COMMERCIAL

## 2024-10-23 PROCEDURE — RXMED WILLOW AMBULATORY MEDICATION CHARGE: Performed by: ORTHOPAEDIC SURGERY

## 2024-10-25 PROCEDURE — RXMED WILLOW AMBULATORY MEDICATION CHARGE: Performed by: ORTHOPAEDIC SURGERY

## 2024-10-29 ENCOUNTER — PHARMACY VISIT (OUTPATIENT)
Dept: PHARMACY | Facility: MEDICAL CENTER | Age: 59
End: 2024-10-29
Payer: COMMERCIAL

## 2024-11-02 PROCEDURE — RXMED WILLOW AMBULATORY MEDICATION CHARGE: Performed by: FAMILY MEDICINE

## 2024-11-04 PROCEDURE — RXMED WILLOW AMBULATORY MEDICATION CHARGE: Performed by: FAMILY MEDICINE

## 2024-11-12 ENCOUNTER — PHARMACY VISIT (OUTPATIENT)
Dept: PHARMACY | Facility: MEDICAL CENTER | Age: 59
End: 2024-11-12
Payer: COMMERCIAL

## 2024-12-12 PROCEDURE — RXMED WILLOW AMBULATORY MEDICATION CHARGE: Performed by: ORTHOPAEDIC SURGERY

## 2025-01-28 ENCOUNTER — PHARMACY VISIT (OUTPATIENT)
Dept: PHARMACY | Facility: MEDICAL CENTER | Age: 60
End: 2025-01-28
Payer: COMMERCIAL

## 2025-01-28 PROCEDURE — RXMED WILLOW AMBULATORY MEDICATION CHARGE: Performed by: FAMILY MEDICINE

## 2025-02-17 PROCEDURE — RXMED WILLOW AMBULATORY MEDICATION CHARGE: Performed by: FAMILY MEDICINE

## 2025-02-20 PROCEDURE — RXMED WILLOW AMBULATORY MEDICATION CHARGE: Performed by: ORTHOPAEDIC SURGERY

## 2025-02-21 ENCOUNTER — PHARMACY VISIT (OUTPATIENT)
Dept: PHARMACY | Facility: MEDICAL CENTER | Age: 60
End: 2025-02-21
Payer: COMMERCIAL

## 2025-03-04 PROCEDURE — RXMED WILLOW AMBULATORY MEDICATION CHARGE: Performed by: FAMILY MEDICINE

## 2025-03-05 ENCOUNTER — PHARMACY VISIT (OUTPATIENT)
Dept: PHARMACY | Facility: MEDICAL CENTER | Age: 60
End: 2025-03-05
Payer: COMMERCIAL

## 2025-04-21 PROCEDURE — RXMED WILLOW AMBULATORY MEDICATION CHARGE: Performed by: FAMILY MEDICINE

## 2025-04-30 ENCOUNTER — PHARMACY VISIT (OUTPATIENT)
Dept: PHARMACY | Facility: MEDICAL CENTER | Age: 60
End: 2025-04-30
Payer: COMMERCIAL

## 2025-05-24 PROCEDURE — RXMED WILLOW AMBULATORY MEDICATION CHARGE: Performed by: FAMILY MEDICINE

## 2025-05-28 ENCOUNTER — PHARMACY VISIT (OUTPATIENT)
Dept: PHARMACY | Facility: MEDICAL CENTER | Age: 60
End: 2025-05-28
Payer: COMMERCIAL

## 2025-05-28 RX ORDER — FOLIC ACID 1 MG/1
TABLET ORAL
Qty: 90 TABLET | Refills: 1 | OUTPATIENT
Start: 2025-05-28

## 2025-07-22 PROCEDURE — RXMED WILLOW AMBULATORY MEDICATION CHARGE: Performed by: FAMILY MEDICINE

## 2025-07-23 ENCOUNTER — PHARMACY VISIT (OUTPATIENT)
Dept: PHARMACY | Facility: MEDICAL CENTER | Age: 60
End: 2025-07-23
Payer: COMMERCIAL

## 2025-08-15 PROCEDURE — RXMED WILLOW AMBULATORY MEDICATION CHARGE: Performed by: FAMILY MEDICINE

## 2025-08-18 ENCOUNTER — PHARMACY VISIT (OUTPATIENT)
Dept: PHARMACY | Facility: MEDICAL CENTER | Age: 60
End: 2025-08-18
Payer: COMMERCIAL

## (undated) DEVICE — CANISTER SUCTION 3000ML MECHANICAL FILTER AUTO SHUTOFF MEDI-VAC NONSTERILE LF DISP  (40EA/CA)

## (undated) DEVICE — GOWN SURGEONS X-LARGE - DISP. (30/CA)

## (undated) DEVICE — PADDING CAST 4 IN STERILE - 4 X 4 YDS (24/CA)

## (undated) DEVICE — SUTURE 3-0 ETHILON FS-1 - (36/BX) 30 INCH

## (undated) DEVICE — SLEEVE VASO CALF MED - (10PR/CA)

## (undated) DEVICE — NEEDLE SAFETY 18 GA X 1 1/2 IN (100EA/BX)

## (undated) DEVICE — SUTURE 0 VICRYL PLUS CT-1 - 8 X 18 INCH (12/BX)

## (undated) DEVICE — GOWN WARMING STANDARD FLEX - (30/CA)

## (undated) DEVICE — SENSOR SPO2 NEO LNCS ADHESIVE (20/BX) SEE USER NOTES

## (undated) DEVICE — TOOL MR8 14CM MATCH HD SYM-TRI 3MM DIAMETER (1/EA)

## (undated) DEVICE — SENSOR OXIMETER ADULT SPO2 RD SET (20EA/BX)

## (undated) DEVICE — ELECTRODE DUAL RETURN W/ CORD - (50/PK)

## (undated) DEVICE — TOWEL STOP TIMEOUT SAFETY FLAG (40EA/CA)

## (undated) DEVICE — TUBING C&T SET FLYING LEADS DRAIN TUBING (10EA/BX)

## (undated) DEVICE — TOOL DISSECTING BIT MR8 OD3MM L14CM (1EA)

## (undated) DEVICE — KIT SURGIFLO W/OUT THROMBIN - (6EA/CA)

## (undated) DEVICE — DRAPE 36X28IN RAD CARM BND BG - (25/CA) O

## (undated) DEVICE — SODIUM CHL. IRRIGATION 0.9% 3000ML (4EA/CA 65CA/PF)

## (undated) DEVICE — PROTECTOR ULNA NERVE - (36PR/CA)

## (undated) DEVICE — COVER LIGHT HANDLE ALC PLUS DISP (18EA/BX)

## (undated) DEVICE — GLOVE BIOGEL SZ 8 SURGICAL PF LTX - (50PR/BX 4BX/CA)

## (undated) DEVICE — LACTATED RINGERS INJ. 500 ML - (24EA/CA)

## (undated) DEVICE — COVER MAYO STAND X-LG - (22EA/CA)

## (undated) DEVICE — CLEANER ELECTRO-SURGICAL TIP - (25/BX 4BX/CA)

## (undated) DEVICE — DRESSING XEROFORM 1X8 - (50/BX 4BX/CA)

## (undated) DEVICE — PACK KNEE ARTHROSCOPY SM OR - (2EA/CA)

## (undated) DEVICE — SPONGE GAUZE STER 4X4 8-PL - (2/PK 50PK/BX 12BX/CS)

## (undated) DEVICE — MASK, LARYNGEAL AIRWAY #4

## (undated) DEVICE — TUBING, SPIROMETRY KIT

## (undated) DEVICE — ADHESIVE MASTISOL - (48/BX)

## (undated) DEVICE — TUBE E-T HI-LO CUFF 7.0MM (10EA/PK)

## (undated) DEVICE — SUCTION INSTRUMENT YANKAUER BULBOUS TIP W/O VENT (50EA/CA)

## (undated) DEVICE — KIT EVACUATER 3 SPRING PVC LF 1/8 DRAIN SIZE (10EA/CA)"

## (undated) DEVICE — KIT ROOM DECONTAMINATION

## (undated) DEVICE — SPONGE GAUZESTER 4 X 4 4PLY - (128PK/CA)

## (undated) DEVICE — DRAPE LAPAROTOMY T SHEET - (12EA/CA)

## (undated) DEVICE — HEADREST PRONEVIEW LARGE - (10/CA)

## (undated) DEVICE — DRAPE LARGE 3 QUARTER - (20/CA)

## (undated) DEVICE — NEURO PACK

## (undated) DEVICE — SUTURE GENERAL

## (undated) DEVICE — MIDAS LUBRICATOR DIFFUSER PACK (4EA/CA)

## (undated) DEVICE — PADDING CAST 6 IN STERILE - 6 X 4 YDS (24/CA)

## (undated) DEVICE — ARMREST CRADLE FOAM - (2PR/PK 12PR/CA)

## (undated) DEVICE — DRAPE IOBAN II INCISE 23X17 - (10EA/BX 4BX/CA)

## (undated) DEVICE — TUBING CASSETTE CROSSFLOW INTEGRATED (10EA/CA)

## (undated) DEVICE — SET LEADWIRE 5 LEAD BEDSIDE DISPOSABLE ECG (1SET OF 5/EA)

## (undated) DEVICE — SHAVER4.0 AGGRESSIVE + FORMLA (5EA/BX)

## (undated) DEVICE — SET EXTENSION WITH 2 PORTS (48EA/CA) ***PART #2C8610 IS A SUBSTITUTE*****

## (undated) DEVICE — GLOVE BIOGEL PI INDICATOR SZ 7.0 SURGICAL PF LF - (50/BX 4BX/CA)

## (undated) DEVICE — GLOVE BIOGEL PI ORTHO SZ 7.5 PF LF (40PR/BX)

## (undated) DEVICE — GLOVE BIOGEL ECLIPSE PF LATEX SIZE 8.0  (50PR/BX)

## (undated) DEVICE — CHLORAPREP 26 ML APPLICATOR - ORANGE TINT(25/CA)

## (undated) DEVICE — GLOVE BIOGEL PI INDICATOR SZ 6.0 SURGICAL PF LF -(200PR/CA)

## (undated) DEVICE — LEGGINGS IN-VIEW CLEAR POLY - (20PR/CA)

## (undated) DEVICE — GLOVE, LITE (PAIR)

## (undated) DEVICE — BLADE SURGICAL CLIPPER - (50EA/CA)

## (undated) DEVICE — LACTATED RINGERS INJ 1000 ML - (14EA/CA 60CA/PF)

## (undated) DEVICE — GLOVE BIOGEL PI ORTHO SZ 6 SURGICAL PF LF (40PR/BX)

## (undated) DEVICE — CLOSURE WOUND 1/4 X 4 (STERI - STRIP) (50/BX 4BX/CA)

## (undated) DEVICE — BOVIE BLADE SHORT - (150EA/CT)

## (undated) DEVICE — TUBING CLEARLINK DUO-VENT - C-FLO (48EA/CA)

## (undated) DEVICE — SODIUM CHL IRRIGATION 0.9% 1000ML (12EA/CA)

## (undated) DEVICE — SYRINGE 30 ML LL (56/BX)

## (undated) DEVICE — DRAPE U ORTHOPEDIC - (10/BX)

## (undated) DEVICE — SUTURE 3-0 VICRYL PLUS RB-1 - 8 X 18 INCH (12/BX)

## (undated) DEVICE — GLOVE SZ 6.5 BIOGEL PI MICRO - PF LF (50PR/BX)